# Patient Record
Sex: MALE | Race: WHITE | NOT HISPANIC OR LATINO | URBAN - METROPOLITAN AREA
[De-identification: names, ages, dates, MRNs, and addresses within clinical notes are randomized per-mention and may not be internally consistent; named-entity substitution may affect disease eponyms.]

---

## 2022-10-13 VITALS
DIASTOLIC BLOOD PRESSURE: 73 MMHG | HEART RATE: 78 BPM | RESPIRATION RATE: 12 BRPM | SYSTOLIC BLOOD PRESSURE: 111 MMHG | TEMPERATURE: 97 F | OXYGEN SATURATION: 100 %

## 2022-10-13 LAB
ALBUMIN SERPL ELPH-MCNC: 4 G/DL — SIGNIFICANT CHANGE UP (ref 3.4–5)
ALP SERPL-CCNC: 119 U/L — SIGNIFICANT CHANGE UP (ref 40–120)
ALT FLD-CCNC: 33 U/L — SIGNIFICANT CHANGE UP (ref 12–42)
AMPHET UR-MCNC: NEGATIVE — SIGNIFICANT CHANGE UP
ANION GAP SERPL CALC-SCNC: 17 MMOL/L — HIGH (ref 9–16)
APAP SERPL-MCNC: <2 UG/ML — LOW (ref 10–30)
APPEARANCE UR: CLEAR — SIGNIFICANT CHANGE UP
APTT BLD: 29.8 SEC — SIGNIFICANT CHANGE UP (ref 27.5–35.5)
AST SERPL-CCNC: 36 U/L — SIGNIFICANT CHANGE UP (ref 15–37)
BACTERIA # UR AUTO: PRESENT /HPF
BARBITURATES UR SCN-MCNC: NEGATIVE — SIGNIFICANT CHANGE UP
BASOPHILS # BLD AUTO: 0.05 K/UL — SIGNIFICANT CHANGE UP (ref 0–0.2)
BASOPHILS NFR BLD AUTO: 0.4 % — SIGNIFICANT CHANGE UP (ref 0–2)
BENZODIAZ UR-MCNC: NEGATIVE — SIGNIFICANT CHANGE UP
BILIRUB SERPL-MCNC: 0.5 MG/DL — SIGNIFICANT CHANGE UP (ref 0.2–1.2)
BILIRUB UR-MCNC: NEGATIVE — SIGNIFICANT CHANGE UP
BUN SERPL-MCNC: 30 MG/DL — HIGH (ref 7–23)
CALCIUM SERPL-MCNC: 8.7 MG/DL — SIGNIFICANT CHANGE UP (ref 8.5–10.5)
CHLORIDE SERPL-SCNC: 104 MMOL/L — SIGNIFICANT CHANGE UP (ref 96–108)
CO2 SERPL-SCNC: 18 MMOL/L — LOW (ref 22–31)
COCAINE METAB.OTHER UR-MCNC: NEGATIVE — SIGNIFICANT CHANGE UP
COHGB MFR BLDV: 5.9 % — HIGH
COLOR SPEC: YELLOW — SIGNIFICANT CHANGE UP
CREAT SERPL-MCNC: 1.41 MG/DL — HIGH (ref 0.5–1.3)
DIFF PNL FLD: ABNORMAL
EGFR: 58 ML/MIN/1.73M2 — LOW
EOSINOPHIL # BLD AUTO: 0.48 K/UL — SIGNIFICANT CHANGE UP (ref 0–0.5)
EOSINOPHIL NFR BLD AUTO: 4 % — SIGNIFICANT CHANGE UP (ref 0–6)
EPI CELLS # UR: SIGNIFICANT CHANGE UP /HPF (ref 0–5)
ETHANOL SERPL-MCNC: 159 MG/DL — HIGH
GLUCOSE BLDC GLUCOMTR-MCNC: 150 MG/DL — HIGH (ref 70–99)
GLUCOSE SERPL-MCNC: 197 MG/DL — HIGH (ref 70–99)
GLUCOSE UR QL: NEGATIVE — SIGNIFICANT CHANGE UP
GRAN CASTS # UR COMP ASSIST: ABNORMAL /LPF
HCT VFR BLD CALC: 47.8 % — SIGNIFICANT CHANGE UP (ref 39–50)
HGB BLD-MCNC: 15.7 G/DL — SIGNIFICANT CHANGE UP (ref 13–17)
HYALINE CASTS # UR AUTO: ABNORMAL /LPF (ref 0–2)
IMM GRANULOCYTES NFR BLD AUTO: 1 % — HIGH (ref 0–0.9)
INR BLD: 1.01 — SIGNIFICANT CHANGE UP (ref 0.88–1.16)
KETONES UR-MCNC: NEGATIVE — SIGNIFICANT CHANGE UP
LACTATE SERPL-SCNC: 6.7 MMOL/L — CRITICAL HIGH (ref 0.4–2)
LEUKOCYTE ESTERASE UR-ACNC: ABNORMAL
LYMPHOCYTES # BLD AUTO: 35.8 % — SIGNIFICANT CHANGE UP (ref 13–44)
LYMPHOCYTES # BLD AUTO: 4.34 K/UL — HIGH (ref 1–3.3)
MAGNESIUM SERPL-MCNC: 2.3 MG/DL — SIGNIFICANT CHANGE UP (ref 1.6–2.6)
MCHC RBC-ENTMCNC: 32.5 PG — SIGNIFICANT CHANGE UP (ref 27–34)
MCHC RBC-ENTMCNC: 32.8 GM/DL — SIGNIFICANT CHANGE UP (ref 32–36)
MCV RBC AUTO: 99 FL — SIGNIFICANT CHANGE UP (ref 80–100)
METHADONE UR-MCNC: NEGATIVE — SIGNIFICANT CHANGE UP
METHGB MFR BLDV: 0.7 % — SIGNIFICANT CHANGE UP
MONOCYTES # BLD AUTO: 0.85 K/UL — SIGNIFICANT CHANGE UP (ref 0–0.9)
MONOCYTES NFR BLD AUTO: 7 % — SIGNIFICANT CHANGE UP (ref 2–14)
NEUTROPHILS # BLD AUTO: 6.29 K/UL — SIGNIFICANT CHANGE UP (ref 1.8–7.4)
NEUTROPHILS NFR BLD AUTO: 51.8 % — SIGNIFICANT CHANGE UP (ref 43–77)
NITRITE UR-MCNC: NEGATIVE — SIGNIFICANT CHANGE UP
NRBC # BLD: 0 /100 WBCS — SIGNIFICANT CHANGE UP (ref 0–0)
OPIATES UR-MCNC: NEGATIVE — SIGNIFICANT CHANGE UP
PCO2 BLDV: 46 MMHG — SIGNIFICANT CHANGE UP (ref 42–55)
PCP SPEC-MCNC: SIGNIFICANT CHANGE UP
PCP UR-MCNC: NEGATIVE — SIGNIFICANT CHANGE UP
PH BLDV: 7.15 — LOW (ref 7.32–7.43)
PH UR: 6 — SIGNIFICANT CHANGE UP (ref 5–8)
PLATELET # BLD AUTO: 181 K/UL — SIGNIFICANT CHANGE UP (ref 150–400)
PO2 BLDV: 192 MMHG — HIGH (ref 25–45)
POTASSIUM SERPL-MCNC: 3.4 MMOL/L — LOW (ref 3.5–5.3)
POTASSIUM SERPL-SCNC: 3.4 MMOL/L — LOW (ref 3.5–5.3)
PROT SERPL-MCNC: 7.5 G/DL — SIGNIFICANT CHANGE UP (ref 6.4–8.2)
PROT UR-MCNC: 100 MG/DL
PROTHROM AB SERPL-ACNC: 11.7 SEC — SIGNIFICANT CHANGE UP (ref 10.5–13.4)
RBC # BLD: 4.83 M/UL — SIGNIFICANT CHANGE UP (ref 4.2–5.8)
RBC # FLD: 12.8 % — SIGNIFICANT CHANGE UP (ref 10.3–14.5)
RBC CASTS # UR COMP ASSIST: < 5 /HPF — SIGNIFICANT CHANGE UP
SALICYLATES SERPL-MCNC: 3.9 MG/DL — SIGNIFICANT CHANGE UP (ref 2.8–20)
SAO2 % BLDV: 99.9 % — HIGH (ref 67–88)
SODIUM SERPL-SCNC: 139 MMOL/L — SIGNIFICANT CHANGE UP (ref 132–145)
SP GR SPEC: 1.02 — SIGNIFICANT CHANGE UP (ref 1–1.03)
THC UR QL: POSITIVE
TSH SERPL-MCNC: 16.47 UIU/ML — HIGH (ref 0.36–3.74)
UROBILINOGEN FLD QL: 0.2 E.U./DL — SIGNIFICANT CHANGE UP
WBC # BLD: 12.13 K/UL — HIGH (ref 3.8–10.5)
WBC # FLD AUTO: 12.13 K/UL — HIGH (ref 3.8–10.5)
WBC UR QL: > 10 /HPF

## 2022-10-13 PROCEDURE — 93010 ELECTROCARDIOGRAM REPORT: CPT

## 2022-10-13 PROCEDURE — 70450 CT HEAD/BRAIN W/O DYE: CPT | Mod: 26

## 2022-10-13 PROCEDURE — 72125 CT NECK SPINE W/O DYE: CPT | Mod: 26

## 2022-10-13 PROCEDURE — 71045 X-RAY EXAM CHEST 1 VIEW: CPT | Mod: 26

## 2022-10-13 PROCEDURE — 99291 CRITICAL CARE FIRST HOUR: CPT

## 2022-10-13 RX ORDER — SODIUM CHLORIDE 9 MG/ML
1000 INJECTION INTRAMUSCULAR; INTRAVENOUS; SUBCUTANEOUS ONCE
Refills: 0 | Status: COMPLETED | OUTPATIENT
Start: 2022-10-13 | End: 2022-10-13

## 2022-10-13 RX ORDER — SODIUM CHLORIDE 9 MG/ML
1000 INJECTION INTRAMUSCULAR; INTRAVENOUS; SUBCUTANEOUS
Refills: 0 | Status: DISCONTINUED | OUTPATIENT
Start: 2022-10-13 | End: 2022-10-14

## 2022-10-13 RX ORDER — NALOXONE HYDROCHLORIDE 4 MG/.1ML
2 SPRAY NASAL ONCE
Refills: 0 | Status: COMPLETED | OUTPATIENT
Start: 2022-10-13 | End: 2022-10-13

## 2022-10-13 RX ADMIN — SODIUM CHLORIDE 1000 MILLILITER(S): 9 INJECTION INTRAMUSCULAR; INTRAVENOUS; SUBCUTANEOUS at 23:49

## 2022-10-13 RX ADMIN — NALOXONE HYDROCHLORIDE 2 MILLIGRAM(S): 4 SPRAY NASAL at 22:51

## 2022-10-13 RX ADMIN — SODIUM CHLORIDE 1000 MILLILITER(S): 9 INJECTION INTRAMUSCULAR; INTRAVENOUS; SUBCUTANEOUS at 23:48

## 2022-10-13 RX ADMIN — SODIUM CHLORIDE 1000 MILLILITER(S): 9 INJECTION INTRAMUSCULAR; INTRAVENOUS; SUBCUTANEOUS at 23:40

## 2022-10-13 NOTE — ED ADULT TRIAGE NOTE - CHIEF COMPLAINT QUOTE
[place] - per EMS pt is contracted for work and was found unconscious and blue. given narcan x3 by PD and x2 by EMS. on arrival to ED, oral airway in place and ambu bag for oxygen. unresponsive to stimuli

## 2022-10-13 NOTE — ED ADULT NURSE NOTE - OBJECTIVE STATEMENT
BIBEMS found unresponsive in [place] station. Given narcan 12 MG IN with no response.   Upon arrival to [facility], pt is unresponsive to pain, being manually bagged with OPA in place. Narcan 2 mg given IVP BIBEMS found unresponsive in [facility]. Given narcan 12 MG IN with no response.   Upon arrival to [facility], pt is unresponsive to pain, being manually bagged with OPA in place. Narcan 2 mg given IVP. Pt began breathing on his own, still minimally responsive.  Approx. 10 min later, painful stimuli initiated by MD, and pt responded appropriately and woke up.  Pt altered, and confused. Reoriented to situation.  Taken to CT scan

## 2022-10-14 ENCOUNTER — INPATIENT (INPATIENT)
Facility: HOSPITAL | Age: 57
LOS: 1 days | Discharge: HOME | DRG: 189 | End: 2022-10-16
Attending: INTERNAL MEDICINE | Admitting: INTERNAL MEDICINE
Payer: COMMERCIAL

## 2022-10-14 DIAGNOSIS — R79.89 OTHER SPECIFIED ABNORMAL FINDINGS OF BLOOD CHEMISTRY: ICD-10-CM

## 2022-10-14 DIAGNOSIS — Z29.9 ENCOUNTER FOR PROPHYLACTIC MEASURES, UNSPECIFIED: ICD-10-CM

## 2022-10-14 DIAGNOSIS — F10.929 ALCOHOL USE, UNSPECIFIED WITH INTOXICATION, UNSPECIFIED: ICD-10-CM

## 2022-10-14 DIAGNOSIS — R41.82 ALTERED MENTAL STATUS, UNSPECIFIED: ICD-10-CM

## 2022-10-14 DIAGNOSIS — J96.02 ACUTE RESPIRATORY FAILURE WITH HYPERCAPNIA: ICD-10-CM

## 2022-10-14 DIAGNOSIS — I10 ESSENTIAL (PRIMARY) HYPERTENSION: ICD-10-CM

## 2022-10-14 DIAGNOSIS — R77.8 OTHER SPECIFIED ABNORMALITIES OF PLASMA PROTEINS: ICD-10-CM

## 2022-10-14 DIAGNOSIS — R56.9 UNSPECIFIED CONVULSIONS: ICD-10-CM

## 2022-10-14 LAB
ALBUMIN SERPL ELPH-MCNC: 4.5 G/DL — SIGNIFICANT CHANGE UP (ref 3.3–5)
ALBUMIN SERPL ELPH-MCNC: 4.9 G/DL — SIGNIFICANT CHANGE UP (ref 3.3–5)
ALP SERPL-CCNC: 117 U/L — SIGNIFICANT CHANGE UP (ref 40–120)
ALP SERPL-CCNC: 122 U/L — HIGH (ref 40–120)
ALT FLD-CCNC: 24 U/L — SIGNIFICANT CHANGE UP (ref 10–45)
ALT FLD-CCNC: 26 U/L — SIGNIFICANT CHANGE UP (ref 10–45)
ANION GAP SERPL CALC-SCNC: 10 MMOL/L — SIGNIFICANT CHANGE UP (ref 5–17)
ANION GAP SERPL CALC-SCNC: 11 MMOL/L — SIGNIFICANT CHANGE UP (ref 5–17)
APTT BLD: 30.1 SEC — SIGNIFICANT CHANGE UP (ref 27.5–35.5)
AST SERPL-CCNC: 28 U/L — SIGNIFICANT CHANGE UP (ref 10–40)
AST SERPL-CCNC: 32 U/L — SIGNIFICANT CHANGE UP (ref 10–40)
BASE EXCESS BLDA CALC-SCNC: -4.8 MMOL/L — LOW (ref -2–3)
BASOPHILS # BLD AUTO: 0.03 K/UL — SIGNIFICANT CHANGE UP (ref 0–0.2)
BASOPHILS NFR BLD AUTO: 0.2 % — SIGNIFICANT CHANGE UP (ref 0–2)
BILIRUB SERPL-MCNC: 0.3 MG/DL — SIGNIFICANT CHANGE UP (ref 0.2–1.2)
BILIRUB SERPL-MCNC: 0.6 MG/DL — SIGNIFICANT CHANGE UP (ref 0.2–1.2)
BLD GP AB SCN SERPL QL: NEGATIVE — SIGNIFICANT CHANGE UP
BUN SERPL-MCNC: 26 MG/DL — HIGH (ref 7–23)
BUN SERPL-MCNC: 30 MG/DL — HIGH (ref 7–23)
CALCIUM SERPL-MCNC: 8.6 MG/DL — SIGNIFICANT CHANGE UP (ref 8.4–10.5)
CALCIUM SERPL-MCNC: 9.1 MG/DL — SIGNIFICANT CHANGE UP (ref 8.4–10.5)
CHLORIDE SERPL-SCNC: 107 MMOL/L — SIGNIFICANT CHANGE UP (ref 96–108)
CHLORIDE SERPL-SCNC: 109 MMOL/L — HIGH (ref 96–108)
CK MB CFR SERPL CALC: 4.9 NG/ML — SIGNIFICANT CHANGE UP (ref 0–6.7)
CK MB CFR SERPL CALC: 7 NG/ML — HIGH (ref 0–6.7)
CK SERPL-CCNC: 132 U/L — SIGNIFICANT CHANGE UP (ref 30–200)
CK SERPL-CCNC: 162 U/L — SIGNIFICANT CHANGE UP (ref 30–200)
CO2 BLDA-SCNC: 26 MMOL/L — HIGH (ref 19–24)
CO2 SERPL-SCNC: 23 MMOL/L — SIGNIFICANT CHANGE UP (ref 22–31)
CO2 SERPL-SCNC: 26 MMOL/L — SIGNIFICANT CHANGE UP (ref 22–31)
CREAT SERPL-MCNC: 0.96 MG/DL — SIGNIFICANT CHANGE UP (ref 0.5–1.3)
CREAT SERPL-MCNC: 1.2 MG/DL — SIGNIFICANT CHANGE UP (ref 0.5–1.3)
EGFR: 70 ML/MIN/1.73M2 — SIGNIFICANT CHANGE UP
EGFR: 92 ML/MIN/1.73M2 — SIGNIFICANT CHANGE UP
EOSINOPHIL # BLD AUTO: 0 K/UL — SIGNIFICANT CHANGE UP (ref 0–0.5)
EOSINOPHIL NFR BLD AUTO: 0 % — SIGNIFICANT CHANGE UP (ref 0–6)
GAS PNL BLDA: SIGNIFICANT CHANGE UP
GAS PNL BLDA: SIGNIFICANT CHANGE UP
GLUCOSE BLDC GLUCOMTR-MCNC: 103 MG/DL — HIGH (ref 70–99)
GLUCOSE BLDC GLUCOMTR-MCNC: 115 MG/DL — HIGH (ref 70–99)
GLUCOSE BLDC GLUCOMTR-MCNC: 262 MG/DL — HIGH (ref 70–99)
GLUCOSE SERPL-MCNC: 119 MG/DL — HIGH (ref 70–99)
GLUCOSE SERPL-MCNC: 131 MG/DL — HIGH (ref 70–99)
HCO3 BLDA-SCNC: 24 MMOL/L — SIGNIFICANT CHANGE UP (ref 21–28)
HCT VFR BLD CALC: 46.7 % — SIGNIFICANT CHANGE UP (ref 39–50)
HGB BLD-MCNC: 15.4 G/DL — SIGNIFICANT CHANGE UP (ref 13–17)
HOROWITZ INDEX BLDA+IHG-RTO: 40 — SIGNIFICANT CHANGE UP
IMM GRANULOCYTES NFR BLD AUTO: 0.5 % — SIGNIFICANT CHANGE UP (ref 0–0.9)
INR BLD: 0.98 — SIGNIFICANT CHANGE UP (ref 0.88–1.16)
LACTATE SERPL-SCNC: 0.9 MMOL/L — SIGNIFICANT CHANGE UP (ref 0.5–2)
LACTATE SERPL-SCNC: 3.3 MMOL/L — HIGH (ref 0.4–2)
LYMPHOCYTES # BLD AUTO: 0.75 K/UL — LOW (ref 1–3.3)
LYMPHOCYTES # BLD AUTO: 6 % — LOW (ref 13–44)
MAGNESIUM SERPL-MCNC: 1.9 MG/DL — SIGNIFICANT CHANGE UP (ref 1.6–2.6)
MCHC RBC-ENTMCNC: 32.4 PG — SIGNIFICANT CHANGE UP (ref 27–34)
MCHC RBC-ENTMCNC: 33 GM/DL — SIGNIFICANT CHANGE UP (ref 32–36)
MCV RBC AUTO: 98.1 FL — SIGNIFICANT CHANGE UP (ref 80–100)
MONOCYTES # BLD AUTO: 0.81 K/UL — SIGNIFICANT CHANGE UP (ref 0–0.9)
MONOCYTES NFR BLD AUTO: 6.4 % — SIGNIFICANT CHANGE UP (ref 2–14)
NEUTROPHILS # BLD AUTO: 10.93 K/UL — HIGH (ref 1.8–7.4)
NEUTROPHILS NFR BLD AUTO: 86.9 % — HIGH (ref 43–77)
NRBC # BLD: 0 /100 WBCS — SIGNIFICANT CHANGE UP (ref 0–0)
PCO2 BLDA: 58 MMHG — HIGH (ref 35–48)
PCO2 BLDV: 59 MMHG — HIGH (ref 42–55)
PCO2 BLDV: 61 MMHG — HIGH (ref 42–55)
PH BLDA: 7.22 — LOW (ref 7.35–7.45)
PH BLDV: 7.16 — LOW (ref 7.32–7.43)
PH BLDV: 7.16 — LOW (ref 7.32–7.43)
PHOSPHATE SERPL-MCNC: 4.8 MG/DL — HIGH (ref 2.5–4.5)
PLATELET # BLD AUTO: 180 K/UL — SIGNIFICANT CHANGE UP (ref 150–400)
PO2 BLDA: 71 MMHG — LOW (ref 83–108)
PO2 BLDV: 140 MMHG — HIGH (ref 25–45)
PO2 BLDV: 96 MMHG — HIGH (ref 25–45)
POTASSIUM SERPL-MCNC: 4.7 MMOL/L — SIGNIFICANT CHANGE UP (ref 3.5–5.3)
POTASSIUM SERPL-MCNC: 5.4 MMOL/L — HIGH (ref 3.5–5.3)
POTASSIUM SERPL-SCNC: 4.7 MMOL/L — SIGNIFICANT CHANGE UP (ref 3.5–5.3)
POTASSIUM SERPL-SCNC: 5.4 MMOL/L — HIGH (ref 3.5–5.3)
PROT SERPL-MCNC: 7.3 G/DL — SIGNIFICANT CHANGE UP (ref 6–8.3)
PROT SERPL-MCNC: 7.4 G/DL — SIGNIFICANT CHANGE UP (ref 6–8.3)
PROTHROM AB SERPL-ACNC: 11.7 SEC — SIGNIFICANT CHANGE UP (ref 10.5–13.4)
RBC # BLD: 4.76 M/UL — SIGNIFICANT CHANGE UP (ref 4.2–5.8)
RBC # FLD: 12.7 % — SIGNIFICANT CHANGE UP (ref 10.3–14.5)
RH IG SCN BLD-IMP: POSITIVE — SIGNIFICANT CHANGE UP
SAO2 % BLDA: 94.7 % — SIGNIFICANT CHANGE UP (ref 94–98)
SAO2 % BLDV: 97.9 % — HIGH (ref 67–88)
SAO2 % BLDV: 99.5 % — HIGH (ref 67–88)
SARS-COV-2 RNA SPEC QL NAA+PROBE: SIGNIFICANT CHANGE UP
SODIUM SERPL-SCNC: 143 MMOL/L — SIGNIFICANT CHANGE UP (ref 135–145)
SODIUM SERPL-SCNC: 143 MMOL/L — SIGNIFICANT CHANGE UP (ref 135–145)
T4 AB SER-ACNC: 7.38 UG/DL — SIGNIFICANT CHANGE UP (ref 4.5–11.7)
TROPONIN T SERPL-MCNC: 0.05 NG/ML — CRITICAL HIGH (ref 0–0.01)
TROPONIN T SERPL-MCNC: 0.06 NG/ML — CRITICAL HIGH (ref 0–0.01)
TROPONIN T SERPL-MCNC: 0.06 NG/ML — CRITICAL HIGH (ref 0–0.01)
WBC # BLD: 12.58 K/UL — HIGH (ref 3.8–10.5)
WBC # FLD AUTO: 12.58 K/UL — HIGH (ref 3.8–10.5)

## 2022-10-14 PROCEDURE — 99291 CRITICAL CARE FIRST HOUR: CPT | Mod: GC

## 2022-10-14 PROCEDURE — 74018 RADEX ABDOMEN 1 VIEW: CPT | Mod: 26

## 2022-10-14 PROCEDURE — 71275 CT ANGIOGRAPHY CHEST: CPT | Mod: 26

## 2022-10-14 PROCEDURE — 93306 TTE W/DOPPLER COMPLETE: CPT | Mod: 26

## 2022-10-14 RX ORDER — NALOXONE HYDROCHLORIDE 4 MG/.1ML
0.8 SPRAY NASAL ONCE
Refills: 0 | Status: COMPLETED | OUTPATIENT
Start: 2022-10-14 | End: 2022-10-14

## 2022-10-14 RX ORDER — HEPARIN SODIUM 5000 [USP'U]/ML
5000 INJECTION INTRAVENOUS; SUBCUTANEOUS EVERY 8 HOURS
Refills: 0 | Status: DISCONTINUED | OUTPATIENT
Start: 2022-10-14 | End: 2022-10-16

## 2022-10-14 RX ORDER — CHLORHEXIDINE GLUCONATE 213 G/1000ML
1 SOLUTION TOPICAL
Refills: 0 | Status: DISCONTINUED | OUTPATIENT
Start: 2022-10-14 | End: 2022-10-16

## 2022-10-14 RX ORDER — LABETALOL HCL 100 MG
10 TABLET ORAL ONCE
Refills: 0 | Status: COMPLETED | OUTPATIENT
Start: 2022-10-14 | End: 2022-10-14

## 2022-10-14 RX ORDER — INSULIN LISPRO 100/ML
VIAL (ML) SUBCUTANEOUS
Refills: 0 | Status: DISCONTINUED | OUTPATIENT
Start: 2022-10-14 | End: 2022-10-16

## 2022-10-14 RX ORDER — AMLODIPINE BESYLATE 2.5 MG/1
5 TABLET ORAL DAILY
Refills: 0 | Status: DISCONTINUED | OUTPATIENT
Start: 2022-10-14 | End: 2022-10-14

## 2022-10-14 RX ORDER — NICOTINE POLACRILEX 2 MG
1 GUM BUCCAL DAILY
Refills: 0 | Status: DISCONTINUED | OUTPATIENT
Start: 2022-10-14 | End: 2022-10-16

## 2022-10-14 RX ORDER — NALOXONE HYDROCHLORIDE 4 MG/.1ML
0.02 SPRAY NASAL
Qty: 2 | Refills: 0 | Status: DISCONTINUED | OUTPATIENT
Start: 2022-10-14 | End: 2022-10-14

## 2022-10-14 RX ORDER — SODIUM ZIRCONIUM CYCLOSILICATE 10 G/10G
10 POWDER, FOR SUSPENSION ORAL ONCE
Refills: 0 | Status: COMPLETED | OUTPATIENT
Start: 2022-10-14 | End: 2022-10-14

## 2022-10-14 RX ORDER — NALOXONE HYDROCHLORIDE 4 MG/.1ML
0.4 SPRAY NASAL ONCE
Refills: 0 | Status: COMPLETED | OUTPATIENT
Start: 2022-10-14 | End: 2022-10-14

## 2022-10-14 RX ORDER — GLUCAGON INJECTION, SOLUTION 0.5 MG/.1ML
1 INJECTION, SOLUTION SUBCUTANEOUS ONCE
Refills: 0 | Status: DISCONTINUED | OUTPATIENT
Start: 2022-10-14 | End: 2022-10-16

## 2022-10-14 RX ORDER — DEXTROSE 50 % IN WATER 50 %
15 SYRINGE (ML) INTRAVENOUS ONCE
Refills: 0 | Status: DISCONTINUED | OUTPATIENT
Start: 2022-10-14 | End: 2022-10-16

## 2022-10-14 RX ORDER — DEXTROSE 50 % IN WATER 50 %
25 SYRINGE (ML) INTRAVENOUS ONCE
Refills: 0 | Status: DISCONTINUED | OUTPATIENT
Start: 2022-10-14 | End: 2022-10-16

## 2022-10-14 RX ORDER — LABETALOL HCL 100 MG
5 TABLET ORAL ONCE
Refills: 0 | Status: COMPLETED | OUTPATIENT
Start: 2022-10-14 | End: 2022-10-14

## 2022-10-14 RX ORDER — SODIUM CHLORIDE 9 MG/ML
1000 INJECTION, SOLUTION INTRAVENOUS
Refills: 0 | Status: DISCONTINUED | OUTPATIENT
Start: 2022-10-14 | End: 2022-10-16

## 2022-10-14 RX ORDER — IPRATROPIUM/ALBUTEROL SULFATE 18-103MCG
3 AEROSOL WITH ADAPTER (GRAM) INHALATION EVERY 4 HOURS
Refills: 0 | Status: DISCONTINUED | OUTPATIENT
Start: 2022-10-14 | End: 2022-10-16

## 2022-10-14 RX ORDER — DEXTROSE 50 % IN WATER 50 %
12.5 SYRINGE (ML) INTRAVENOUS ONCE
Refills: 0 | Status: DISCONTINUED | OUTPATIENT
Start: 2022-10-14 | End: 2022-10-16

## 2022-10-14 RX ORDER — LANOLIN ALCOHOL/MO/W.PET/CERES
5 CREAM (GRAM) TOPICAL AT BEDTIME
Refills: 0 | Status: DISCONTINUED | OUTPATIENT
Start: 2022-10-14 | End: 2022-10-16

## 2022-10-14 RX ORDER — AMLODIPINE BESYLATE 2.5 MG/1
10 TABLET ORAL EVERY 24 HOURS
Refills: 0 | Status: DISCONTINUED | OUTPATIENT
Start: 2022-10-14 | End: 2022-10-16

## 2022-10-14 RX ORDER — INFLUENZA VIRUS VACCINE 15; 15; 15; 15 UG/.5ML; UG/.5ML; UG/.5ML; UG/.5ML
0.5 SUSPENSION INTRAMUSCULAR ONCE
Refills: 0 | Status: DISCONTINUED | OUTPATIENT
Start: 2022-10-14 | End: 2022-10-16

## 2022-10-14 RX ORDER — NALOXONE HYDROCHLORIDE 4 MG/.1ML
0.4 SPRAY NASAL ONCE
Refills: 0 | Status: DISCONTINUED | OUTPATIENT
Start: 2022-10-14 | End: 2022-10-14

## 2022-10-14 RX ORDER — THIAMINE MONONITRATE (VIT B1) 100 MG
500 TABLET ORAL EVERY 8 HOURS
Refills: 0 | Status: DISCONTINUED | OUTPATIENT
Start: 2022-10-14 | End: 2022-10-16

## 2022-10-14 RX ORDER — HYDRALAZINE HCL 50 MG
5 TABLET ORAL ONCE
Refills: 0 | Status: COMPLETED | OUTPATIENT
Start: 2022-10-14 | End: 2022-10-14

## 2022-10-14 RX ADMIN — Medication 1 PATCH: at 22:20

## 2022-10-14 RX ADMIN — Medication 5 MILLIGRAM(S): at 21:38

## 2022-10-14 RX ADMIN — Medication 5 MILLIGRAM(S): at 11:22

## 2022-10-14 RX ADMIN — SODIUM CHLORIDE 1000 MILLILITER(S): 9 INJECTION INTRAMUSCULAR; INTRAVENOUS; SUBCUTANEOUS at 01:36

## 2022-10-14 RX ADMIN — Medication 105 MILLIGRAM(S): at 11:00

## 2022-10-14 RX ADMIN — Medication 10 MILLIGRAM(S): at 14:08

## 2022-10-14 RX ADMIN — SODIUM ZIRCONIUM CYCLOSILICATE 10 GRAM(S): 10 POWDER, FOR SUSPENSION ORAL at 09:31

## 2022-10-14 RX ADMIN — AMLODIPINE BESYLATE 5 MILLIGRAM(S): 2.5 TABLET ORAL at 11:23

## 2022-10-14 RX ADMIN — Medication 10 MILLIGRAM(S): at 13:44

## 2022-10-14 RX ADMIN — Medication 10 MILLIGRAM(S): at 08:16

## 2022-10-14 RX ADMIN — Medication 3 MILLILITER(S): at 13:09

## 2022-10-14 RX ADMIN — Medication 6: at 21:56

## 2022-10-14 RX ADMIN — NALOXONE HYDROCHLORIDE 0.4 MILLIGRAM(S): 4 SPRAY NASAL at 12:20

## 2022-10-14 RX ADMIN — Medication 3 MILLILITER(S): at 08:17

## 2022-10-14 RX ADMIN — HEPARIN SODIUM 5000 UNIT(S): 5000 INJECTION INTRAVENOUS; SUBCUTANEOUS at 21:41

## 2022-10-14 RX ADMIN — Medication 105 MILLIGRAM(S): at 21:41

## 2022-10-14 RX ADMIN — NALOXONE HYDROCHLORIDE 0.8 MILLIGRAM(S): 4 SPRAY NASAL at 12:51

## 2022-10-14 RX ADMIN — Medication 3 MILLILITER(S): at 17:19

## 2022-10-14 RX ADMIN — Medication 40 MILLIGRAM(S): at 10:34

## 2022-10-14 RX ADMIN — Medication 1 TABLET(S): at 10:34

## 2022-10-14 RX ADMIN — AMLODIPINE BESYLATE 10 MILLIGRAM(S): 2.5 TABLET ORAL at 21:38

## 2022-10-14 RX ADMIN — Medication 3 MILLILITER(S): at 21:41

## 2022-10-14 NOTE — H&P ADULT - HISTORY OF PRESENT ILLNESS
53 yo M with PMH significant for 40+ pack year smoking history, HTN (untreated), SAVANNAH (no longer on CPAP after significant 100+ lb intentional wt loss), alcohol abuse (5+ mixed drinks daily) presenting from [facility] after pt was found to be unresponsive in [place] station after a night of drinking during a company party. Unclear of amount of alcohol intake, however pt was BIBEMS after called by friends after pt passing out and unable to be awoken. BLS crew gave multiple doses Narcan with no response. On ED arrival, given additional 2 mg IV Narcan with some response as pt was able to be aroused by sternal rub and did not require intubation which was in process of being set up due to altered mental status. Placed on Bipap for respiratory support.     Pt was arousable on exam however remains intermittently lethargic and confused with no recollection of the events overnight. AOx1-2 and repeatedly turned to wife for medical and interval history. Remains with pinpoint pupils, denies any substance use besides marijuana and alcohol. Wife at bedside denies history of seizures, previous withdrawal or hospitalizations.     ED Course:   Vitals: T: 97.4, HR: 78, BP: 111/73, SpO2: 100% on ambu bag/assisted ventilation -> 96% on 4L NC. Desats 90-91% RA  Labs: VBG pH: 7.15, pco2 46, po2 192, carboxyhb.9, K+: 3.4, BUN/Cr: 30/1.41; lactate: 6.7; TSH: 16, WBC: 12, Hbg: 15.7  UTox: Alcohol+ (159), THC+  Imaging: CTH negative, EKG NSR, CT PE negative for PE/consolidations  Interventions: Narcan 2 mg IV

## 2022-10-14 NOTE — PROGRESS NOTE ADULT - PROBLEM SELECTOR PLAN 2
Likely 2/2 alcohol intoxication with component of chronic retention from lung disease iso extensive smoking history. Almost intubated in ED however had response to Narcan in ED and is tolerating bipap at this time although intermittently lethargic   -C/w Bipap   -Serial ABGs to monitor for improvement of acidosis/hypercarbia   -Management of AMS as above     #r/o COPD   Pt likely with undiagnosed COPD with 40+ pack year history. CT 10/14: Mild paraseptal emphysema. Mosaic attenuation. Mild bibasilar subpleural reticulation. No   consolidation. Hypercarbic on ABG likely d/t being chronic retainer with superimposed acidemia and hypercarbia d/t alcohol intoxication. Improving on duonebs and Bipap  -C/w Bipap with de-escalation to NC as tolerated when mental status improves   -Prednisone 40 mg for 5 days (10/14-10/18)   -Duonebs q4h Likely 2/2 alcohol intoxication with component of chronic retention from lung disease iso extensive smoking history. Almost intubated in ED however had response to Narcan in ED and is tolerating bipap at this time although intermittently lethargic   -C/w Bipap overnight  -Serial ABGs to monitor for improvement of acidosis/hypercarbia   -Management of AMS as above   -pH 7.22->7.34, CO2 58 -> 47 while on BiPAP    #r/o COPD   Pt likely with undiagnosed COPD with 40+ pack year history. CT 10/14: Mild paraseptal emphysema. Mosaic attenuation. Mild bibasilar subpleural reticulation. No consolidation. Hypercarbic on ABG likely d/t being chronic retainer with superimposed acidemia and hypercarbia d/t alcohol intoxication. Improving on duonebs and Bipap  -C/w Bipap with de-escalation to NC as tolerated    -Prednisone 40 mg for 5 days (10/14-10/18)   -Duonebs q4h

## 2022-10-14 NOTE — ED PROVIDER NOTE - OBJECTIVE STATEMENT
Pt is a 55yo M with unknown PMH/PSH.  Pt is BIB EMS after being found down in the service tunnels at [facility].  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  Found unresponsive and "blue."  BLS crew placed an oral airway and started bagging patient.  Never lost pulse.  Color came back with BVM.  Pt noted to have pinpoint pupils b/l and was given multiple doses of narcan IN en route, up to 12mg.  No response.      On arrival here, pt noted to have pinpoint pupils, strong pulses throughout, and breathing on his own.  Initially pt unresponsive with GCS of 8.  Pt given 2mg of Narcan IV.  Initially no response but while setting up to intubate, we sternally rubbed patient and he opened eyes and came two.  He is very confused and has no memory of night.  Pt noted to be perseverating and keeps asking repetatively what happened to him. Pt is a 55yo M with unknown PMH/PSH.  Pt is BIB EMS after being found down in the service tunnels at [facility] [employer] [facility].  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  Found unresponsive and "blue."  BLS crew placed an oral airway and started bagging patient.  Never lost pulse.  Color came back with BVM.  Pt noted to have pinpoint pupils b/l and was given multiple doses of narcan IN en route, up to 12mg.  No response.      On arrival here, pt noted to have pinpoint pupils, strong pulses throughout, and breathing on his own.  Initially pt unresponsive with GCS of 8.  Pt given 2mg of Narcan IV.  Initially no response but while setting up to intubate, we sternally rubbed patient and he opened eyes and came two.  He is very confused and has no memory of night.  Pt noted to be perseverating and keeps asking repetitively what happened to him.    Additional history obtained from  and pt's wife Jaleesa, who met him here in the ED.  They were interviewed separately but gave same history.  They reports h/o HTN but not currently on medication.  Also daily smoker for 40+ years.  Remote h/o asthma.  Denies any h/o seizures.  He is a daily drinker.  Wife reports drinks heavily but no h/o etoh w/d.  Pt has no recollection of what happened today and his last memory is going to work.  he works as a  at [employer].   Co-worker also meets patient here in ED but he did not see patient for latter part of day and does not know what happened to pt.

## 2022-10-14 NOTE — PROGRESS NOTE ADULT - PROBLEM SELECTOR PLAN 1
2/2 hypercarbic respiratory failure iso alcohol intoxication vs possible superimposed opioid use. Found unresponsive in [place] station after admitted extensive alcohol use overnight (cannot quantify amount). Hx of 5-6 mixed drinks daily per wife at bedside, Utox+ alcohol (@ 159 mg/dl). Negative opioid panel, however pin point pupils on exam with some response to Narcan 2 mg IV in ED. Currently remains intermittently lethargic. ABG acidotic @ 7.26 with hypercarbia (CO2: 57); Likely CO2 retainer with undiagnosed COPD iso smoking history and rhonchi on lung exam   -Management for etoh intoxication as below   -Giving additional narcan 0.4 iso persisting hypercarbia, pinpoint pupils iso AMS   -Serial ABGs to monitor hypercarbia 2/2 hypercarbic respiratory failure iso alcohol intoxication vs possible superimposed opioid use. Found unresponsive in [place] station after admitted extensive alcohol use overnight (cannot quantify amount). Hx of 5-6 mixed drinks daily per wife at bedside, Utox+ alcohol (@ 159 mg/dl). Negative opioid panel, however pin point pupils on exam with some response to Narcan 2 mg IV in ED. Currently remains intermittently lethargic. ABG acidotic @ 7.26 with hypercarbia (CO2: 57); Likely CO2 retainer with undiagnosed COPD iso smoking history and rhonchi on lung exam   -Management for etoh intoxication as below   -Given additional narcan 0.4 and 0.8 during day (10/14) iso persisting hypercarbia, pinpoint pupils iso AMS   -Serial ABGs to monitor hypercarbia  -pH 7.22->7.34, CO2 58 -> 47 while on BiPAP 2/2 hypercarbic respiratory failure iso alcohol intoxication vs possible superimposed opioid use. Found unresponsive in [place] station after admitted extensive alcohol use overnight (cannot quantify amount). Hx of 5-6 mixed drinks daily per wife at bedside, Utox+ alcohol (@ 159 mg/dl). Negative opioid panel, however pin point pupils on exam with some response to Narcan 2 mg IV in ED. Currently remains intermittently lethargic. ABG acidotic @ 7.26 with hypercarbia (CO2: 57); Likely CO2 retainer with undiagnosed COPD iso smoking history and rhonchi on lung exam   -Management for etoh intoxication as below   -Given additional narcan 0.4 and 0.8 during day (10/14) iso persisting hypercarbia, pinpoint pupils iso AMS   -Serial ABGs to monitor hypercarbia  -pH 7.22->7.34, CO2 58 -> 47 while on BiPAP; remained on BiPAP overnight

## 2022-10-14 NOTE — PATIENT PROFILE ADULT - FALL HARM RISK - RISK INTERVENTIONS

## 2022-10-14 NOTE — PROGRESS NOTE ADULT - PROBLEM SELECTOR PLAN 5
EKG NSR. Tropes elevated to 0.05 on admission, likely demand ischemia. No hx per pt or wife at bedside of CAD or cardiac pathology. Denies having previous ECHOs or stress tests. Denies chest pain, palpitations   -Trend troponins to peak  -Obtain baseline ECHO EKG NSR. Trops elevated to 0.05 on admission, likely demand ischemia. No hx per pt or wife at bedside of CAD or cardiac pathology. Denies having previous ECHOs or stress tests. Denies chest pain, palpitations.  -Trend troponins to peak  -Obtain baseline ECHO

## 2022-10-14 NOTE — H&P ADULT - ASSESSMENT
55 yo M with PMH significant for 40+ pack year smoking history, HTN (untreated), SAVANNAH (no longer on CPAP after significant 100+ lb intentional wt loss), alcohol abuse (5+ mixed drinks daily) presenting from [facility] with altered mental status iso alcohol intoxication, admitted for hypercarbic respiratory failure likely 2/2 alcohol intoxication vs possible superimposed opioid overdose     NEURO     #AMS   2/2 hypercarbic respiratory failure iso alcohol intoxication vs possible superimposed opioid use. Found unresponsive in [place] station after admitted extensive alcohol use overnight (cannot quantify amount). Hx of 5-6 mixed drinks daily per wife at bedside, Utox+ alcohol (@ 159 mg/dl). Negative opioid panel, however pin point pupils on exam with some response to Narcan 2 mg IV in ED. Currently remains intermittently lethargic. ABG acidotic @ 7.26 with hypercarbia (CO2: 57); Likely CO2 retainer with undiagnosed COPD iso smoking history and rhonchi on lung exam   -Management for etoh intoxication as below   -Giving additional narcan 0.4 iso persisting hypercarbia, pinpoint pupils iso AMS   -Serial ABGs to monitor hypercarbia     #Etoh intoxication   Assessment as above  -CIWA protocol   -Avoiding standing benzos iso AMS  -C/w high dose thiamine for 3 days (10/14-10/16)  -C/w MV     PULM    #Hypercarbic respiratory failure   Likely 2/2 alcohol intoxication with component of chronic retention from lung disease iso extensive smoking history. Almost intubated in ED however had response to Narcan in ED and is tolerating bipap at this time although intermittently lethargic   -C/w Bipap   -Serial ABGs to monitor for improvement of acidosis/hypercarbia   -Management of AMS as above     #r/o COPD   Pt likely with undiagnosed COPD with 40+ pack year history. CT 10/14: Mild paraseptal emphysema. Mosaic attenuation. Mild bibasilar subpleural reticulation. No   consolidation. Hypercarbic on ABG likely d/t being chronic retainer with superimposed acidemia and hypercarbia d/t alcohol intoxication. Improving on duonebs and Bipap  -C/w Bipap with de-escalation to NC as tolerated when mental status improves   -Prednisone 40 mg for 5 days (10/14-10/18)   -Duonebs q4h      CVS  #Elevated troponins   EKG NSR. Tropes elevated to 0.05 on admission, likely demand ischemia. No hx per pt or wife at bedside of CAD or cardiac pathology. Denies having previous ECHOs or stress tests. Denies chest pain, palpitations   -Trend troponins to peak  -Obtain baseline ECHO    #HTN   History of untreated HTN with hypertensive urgency with systolic 180-200s. S/p labetalol 10 mg x1, 5 mg x1. Asymptomatic  -Started amlodipine 5 mg qd     GI  NPO while Bipap     ENDO  -on mISS    #Elevated TSH  TSH 16 on admission. Admits to family hx of thyroid disorder however denies personal hx. Asymptomatic at this time   -F/u T3, T4     RENAL  DEUCE    ID  DEUCE    HEME  DEUCE    F: None  E: Replete PRN  GI ppx: None   DVT ppx: Heparin sub q   Lines: peripheral  Dispo: MICU

## 2022-10-14 NOTE — PROGRESS NOTE ADULT - SUBJECTIVE AND OBJECTIVE BOX
***ACCEPTANCE FROM MICU TO TELEMETRY***    Hospital course:      OVERNIGHT EVENTS:    SUBJECTIVE / INTERVAL HPI: Patient seen and examined at bedside.     VITAL SIGNS:  Vital Signs Last 24 Hrs  T(C): 36.6 (14 Oct 2022 17:25), Max: 37.1 (14 Oct 2022 06:00)  T(F): 97.9 (14 Oct 2022 17:25), Max: 98.8 (14 Oct 2022 06:00)  HR: 68 (14 Oct 2022 20:15) (68 - 112)  BP: 184/80 (14 Oct 2022 20:15) (111/65 - 219/94)  BP(mean): 115 (14 Oct 2022 20:15) (96 - 135)  RR: 16 (14 Oct 2022 20:15) (5 - 25)  SpO2: 95% (14 Oct 2022 20:15) (86% - 100%)    Parameters below as of 14 Oct 2022 20:15  Patient On (Oxygen Delivery Method): nasal cannula  O2 Flow (L/min): 2      PHYSICAL EXAM:    General: alert, in no acute distress  HEENT: NC/AT; PERRL, anicteric sclera; MMM  Neck: supple  Cardiovascular: +S1/S2, RRR  Respiratory: CTA B/L; no W/R/R  Gastrointestinal: soft, NT/ND; +BSx4  Extremities: WWP; no edema, clubbing or cyanosis  Vascular: 2+ radial, DP/PT pulses B/L  Neurological: AAOx3; no focal deficits    MEDICATIONS:  MEDICATIONS  (STANDING):  albuterol/ipratropium for Nebulization 3 milliLiter(s) Nebulizer every 4 hours  amLODIPine   Tablet 10 milliGRAM(s) Oral every 24 hours  chlorhexidine 2% Cloths 1 Application(s) Topical <User Schedule>  dextrose 5%. 1000 milliLiter(s) (100 mL/Hr) IV Continuous <Continuous>  dextrose 5%. 1000 milliLiter(s) (50 mL/Hr) IV Continuous <Continuous>  dextrose 50% Injectable 25 Gram(s) IV Push once  dextrose 50% Injectable 12.5 Gram(s) IV Push once  dextrose 50% Injectable 25 Gram(s) IV Push once  glucagon  Injectable 1 milliGRAM(s) IntraMuscular once  heparin   Injectable 5000 Unit(s) SubCutaneous every 8 hours  influenza   Vaccine 0.5 milliLiter(s) IntraMuscular once  insulin lispro (ADMELOG) corrective regimen sliding scale   SubCutaneous Before meals and at bedtime  multivitamin 1 Tablet(s) Oral daily  predniSONE   Tablet 40 milliGRAM(s) Oral daily  thiamine IVPB 500 milliGRAM(s) IV Intermittent every 8 hours    MEDICATIONS  (PRN):  dextrose Oral Gel 15 Gram(s) Oral once PRN Blood Glucose LESS THAN 70 milliGRAM(s)/deciliter      ALLERGIES:  Allergies    No Known Allergies    Intolerances        LABS:                        15.4   12.58 )-----------( 180      ( 14 Oct 2022 07:38 )             46.7     10-    143  |  107  |  26<H>  ----------------------------<  119<H>  4.7   |  26  |  0.96    Ca    9.1      14 Oct 2022 14:59  Phos  4.8     10-14  Mg     1.9     10-    TPro  7.3  /  Alb  4.5  /  TBili  0.6  /  DBili  x   /  AST  28  /  ALT  24  /  AlkPhos  122<H>  10-14    PT/INR - ( 14 Oct 2022 07:38 )   PT: 11.7 sec;   INR: 0.98          PTT - ( 14 Oct 2022 07:38 )  PTT:30.1 sec  Urinalysis Basic - ( 13 Oct 2022 22:56 )    Color: Yellow / Appearance: Clear / S.025 / pH: x  Gluc: x / Ketone: NEGATIVE  / Bili: NEGATIVE / Urobili: 0.2 E.U./dL   Blood: x / Protein: 100 mg/dL / Nitrite: NEGATIVE   Leuk Esterase: Small / RBC: < 5 /HPF / WBC > 10 /HPF   Sq Epi: x / Non Sq Epi: 0-5 /HPF / Bacteria: Present /HPF      CAPILLARY BLOOD GLUCOSE      POCT Blood Glucose.: 262 mg/dL (14 Oct 2022 21:44)      RADIOLOGY & ADDITIONAL TESTS: Reviewed. ***ACCEPTANCE FROM MICU TO TELEMETRY***    Hospital course:  55 yo M with PMH significant for 40+ pack year smoking history, HTN (untreated), SAVANNAH (no longer on CPAP after significant 100+ lb intentional wt loss), alcohol abuse (5+ mixed drinks daily), presenting from [facility] after pt was found to be unresponsive in [place] station after a night of drinking during a company party. Unclear of amount of alcohol intake, however pt was BIBEMS after called by friends after pt passing out and unable to be awoken. BLS crew gave multiple doses Narcan with no response. On ED arrival, given additional 2 mg IV Narcan with some response as pt was able to be aroused by sternal rub and did not require intubation which was in process of being set up due to altered mental status. Utox+ alcohol (@ 159 mg/dl) and THC. ABG acidotic w/ Ph7.26 with hypercarbia (CO2: 57), patient placed on Bipap for respiratory support. Patient was arousable on exam however remained intermittently lethargic and confused with no recollection of the events overnight. AOx1-2 and repeatedly turned to wife for medical and interval history. He denies any substance use besides marijuana and alcohol. Wife at bedside denies history of seizures, previous withdrawal or hospitalizations. CIWA protocol in place, started high dose thiamine for 3 days (10/14-10/16), and multivitamin daily. Started Prednisone 40 mg for 5 days (10/14-10/18) for undiagnosed COPD w/ 40 pack year hx. He remains on BiPAP due to intermittent lethargy when placed on nasal canula. He was hypertensive during the day to BP 190s/100s and given Labetalol 5mg push and amlodipine 5mg. His CO2 was not improving while on Bipap, so Narcan .4mg  was given.  He was given an additional Narcan .8mg during the day. Bedside US showed mosaic pattern- likely air trapping. He was given Labetalol 10mg for elevated BP over 200. Started standing Amlodipine 10mg qd.      OVERNIGHT EVENTS/ Subjective HPI:   Overnight, he remained hypertensive and was given IV hydral 5mg. He remained on Bipap overnight due to admission w/ hypercarbic respiratory failure and likely history of COPD. He is eating dinner in chair and comfortable.     VITAL SIGNS:  Vital Signs Last 24 Hrs  T(C): 36.6 (14 Oct 2022 17:25), Max: 37.1 (14 Oct 2022 06:00)  T(F): 97.9 (14 Oct 2022 17:25), Max: 98.8 (14 Oct 2022 06:00)  HR: 68 (14 Oct 2022 20:15) (68 - 112)  BP: 184/80 (14 Oct 2022 20:15) (111/65 - 219/94)  BP(mean): 115 (14 Oct 2022 20:15) (96 - 135)  RR: 16 (14 Oct 2022 20:15) (5 - 25)  SpO2: 95% (14 Oct 2022 20:15) (86% - 100%)    Parameters below as of 14 Oct 2022 20:15  Patient On (Oxygen Delivery Method): nasal cannula  O2 Flow (L/min): 2      PHYSICAL EXAM:  Constitutional: NAD, eating dinner;  HEENT: NC/AT, pinpoint pupils with sluggish reaction; EOMI, no conjunctival pallor or scleral icterus, MMM  Neck: Supple, no JVD  Respiratory: Expiratory wheezing and rhonchi of upper lung fields, Decreased breath sounds at b/l lower bases   Cardiovascular: RRR, Grade 2 systolic murmur   Gastrointestinal: +BS, soft NTND, no guarding or rebound tenderness, no palpable masses   Extremities: wwp; no cyanosis, clubbing or edema.   Vascular: Pulses equal and strong throughout.   Neurological: AAOx3, no CN deficits, strength and sensation intact throughout.   Skin: No gross skin abnormalities or rashes    MEDICATIONS:  MEDICATIONS  (STANDING):  albuterol/ipratropium for Nebulization 3 milliLiter(s) Nebulizer every 4 hours  amLODIPine   Tablet 10 milliGRAM(s) Oral every 24 hours  chlorhexidine 2% Cloths 1 Application(s) Topical <User Schedule>  dextrose 5%. 1000 milliLiter(s) (100 mL/Hr) IV Continuous <Continuous>  dextrose 5%. 1000 milliLiter(s) (50 mL/Hr) IV Continuous <Continuous>  dextrose 50% Injectable 25 Gram(s) IV Push once  dextrose 50% Injectable 12.5 Gram(s) IV Push once  dextrose 50% Injectable 25 Gram(s) IV Push once  glucagon  Injectable 1 milliGRAM(s) IntraMuscular once  heparin   Injectable 5000 Unit(s) SubCutaneous every 8 hours  influenza   Vaccine 0.5 milliLiter(s) IntraMuscular once  insulin lispro (ADMELOG) corrective regimen sliding scale   SubCutaneous Before meals and at bedtime  multivitamin 1 Tablet(s) Oral daily  predniSONE   Tablet 40 milliGRAM(s) Oral daily  thiamine IVPB 500 milliGRAM(s) IV Intermittent every 8 hours    MEDICATIONS  (PRN):  dextrose Oral Gel 15 Gram(s) Oral once PRN Blood Glucose LESS THAN 70 milliGRAM(s)/deciliter      ALLERGIES:  Allergies    No Known Allergies    Intolerances        LABS:                        15.4   12.58 )-----------( 180      ( 14 Oct 2022 07:38 )             46.7     10-14    143  |  107  |  26<H>  ----------------------------<  119<H>  4.7   |  26  |  0.96    Ca    9.1      14 Oct 2022 14:59  Phos  4.8     10-14  Mg     1.9     10-    TPro  7.3  /  Alb  4.5  /  TBili  0.6  /  DBili  x   /  AST  28  /  ALT  24  /  AlkPhos  122<H>  10-14    PT/INR - ( 14 Oct 2022 07:38 )   PT: 11.7 sec;   INR: 0.98          PTT - ( 14 Oct 2022 07:38 )  PTT:30.1 sec  Urinalysis Basic - ( 13 Oct 2022 22:56 )    Color: Yellow / Appearance: Clear / S.025 / pH: x  Gluc: x / Ketone: NEGATIVE  / Bili: NEGATIVE / Urobili: 0.2 E.U./dL   Blood: x / Protein: 100 mg/dL / Nitrite: NEGATIVE   Leuk Esterase: Small / RBC: < 5 /HPF / WBC > 10 /HPF   Sq Epi: x / Non Sq Epi: 0-5 /HPF / Bacteria: Present /HPF      CAPILLARY BLOOD GLUCOSE      POCT Blood Glucose.: 262 mg/dL (14 Oct 2022 21:44)      RADIOLOGY & ADDITIONAL TESTS: Reviewed. ***ACCEPTANCE FROM MICU TO TELEMETRY***    Hospital course:  55 yo M with PMH significant for 40+ pack year smoking history, HTN (untreated), SAVANNAH (no longer on CPAP after significant 100+ lb intentional wt loss), alcohol abuse (5+ mixed drinks daily), presenting from [facility] after pt was found to be unresponsive in [place] station after a night of drinking during a company party. Unclear of amount of alcohol intake, however pt was BIBEMS after called by friends after pt passing out and unable to be awoken. BLS crew gave multiple doses Narcan with no response. On ED arrival, given additional 2 mg IV Narcan with some response as pt was able to be aroused by sternal rub and did not require intubation which was in process of being set up due to altered mental status. Utox+ alcohol (@ 159 mg/dl) and THC. ABG acidotic w/ Ph7.26 with hypercarbia (CO2: 57), patient placed on Bipap for respiratory support. Patient was arousable on exam however remained intermittently lethargic and confused with no recollection of the events overnight. AOx1-2 and repeatedly turned to wife for medical and interval history. He denies any substance use besides marijuana and alcohol. Wife at bedside denies history of seizures, previous withdrawal or hospitalizations. CIWA protocol in place, started high dose thiamine for 3 days (10/14-10/16), and multivitamin daily. Started Prednisone 40 mg for 5 days (10/14-10/18) for undiagnosed COPD w/ 40 pack year hx. He remains on BiPAP due to intermittent lethargy when placed on nasal canula. He was hypertensive during the day to BP 190s/100s and given Labetalol 5mg push and amlodipine 5mg. His CO2 was not improving while on Bipap, so Narcan .4mg  was given.  He was given an additional Narcan .8mg during the day. Bedside US showed mosaic pattern- likely air trapping. He was given Labetalol 10mg for elevated BP over 200. Started standing Amlodipine 10mg qd.      OVERNIGHT EVENTS/ Subjective HPI:   Overnight, he remained hypertensive and was given IV hydral 5mg. He remained on Bipap overnight due to admission w/ hypercarbic respiratory failure and likely history of COPD. He is eating dinner in chair and comfortable.     VITAL SIGNS:  Vital Signs Last 24 Hrs  T(C): 36.6 (14 Oct 2022 17:25), Max: 37.1 (14 Oct 2022 06:00)  T(F): 97.9 (14 Oct 2022 17:25), Max: 98.8 (14 Oct 2022 06:00)  HR: 68 (14 Oct 2022 20:15) (68 - 112)  BP: 184/80 (14 Oct 2022 20:15) (111/65 - 219/94)  BP(mean): 115 (14 Oct 2022 20:15) (96 - 135)  RR: 16 (14 Oct 2022 20:15) (5 - 25)  SpO2: 95% (14 Oct 2022 20:15) (86% - 100%)    Parameters below as of 14 Oct 2022 20:15  Patient On (Oxygen Delivery Method): nasal cannula  O2 Flow (L/min): 2      PHYSICAL EXAM:  Constitutional: NAD, eating dinner;  HEENT: NC/AT; EOMI, no conjunctival pallor or scleral icterus, MMM  Neck: Supple, no JVD  Respiratory: Expiratory wheezing and rhonchi of upper lung fields, Decreased breath sounds at b/l lower bases   Cardiovascular: RRR, Grade 2 systolic murmur;  Gastrointestinal: +BS, soft NTND, no guarding or rebound tenderness, no palpable masses   Extremities: wwp; no cyanosis, clubbing or edema.   Vascular: Pulses equal and strong throughout.   Neurological: AAOx3, no CN deficits, strength and sensation intact throughout.   Skin: No gross skin abnormalities or rashes    MEDICATIONS:  MEDICATIONS  (STANDING):  albuterol/ipratropium for Nebulization 3 milliLiter(s) Nebulizer every 4 hours  amLODIPine   Tablet 10 milliGRAM(s) Oral every 24 hours  chlorhexidine 2% Cloths 1 Application(s) Topical <User Schedule>  dextrose 5%. 1000 milliLiter(s) (100 mL/Hr) IV Continuous <Continuous>  dextrose 5%. 1000 milliLiter(s) (50 mL/Hr) IV Continuous <Continuous>  dextrose 50% Injectable 25 Gram(s) IV Push once  dextrose 50% Injectable 12.5 Gram(s) IV Push once  dextrose 50% Injectable 25 Gram(s) IV Push once  glucagon  Injectable 1 milliGRAM(s) IntraMuscular once  heparin   Injectable 5000 Unit(s) SubCutaneous every 8 hours  influenza   Vaccine 0.5 milliLiter(s) IntraMuscular once  insulin lispro (ADMELOG) corrective regimen sliding scale   SubCutaneous Before meals and at bedtime  multivitamin 1 Tablet(s) Oral daily  predniSONE   Tablet 40 milliGRAM(s) Oral daily  thiamine IVPB 500 milliGRAM(s) IV Intermittent every 8 hours    MEDICATIONS  (PRN):  dextrose Oral Gel 15 Gram(s) Oral once PRN Blood Glucose LESS THAN 70 milliGRAM(s)/deciliter      ALLERGIES:  Allergies    No Known Allergies    Intolerances        LABS:                        15.4   12.58 )-----------( 180      ( 14 Oct 2022 07:38 )             46.7     10-14    143  |  107  |  26<H>  ----------------------------<  119<H>  4.7   |  26  |  0.96    Ca    9.1      14 Oct 2022 14:59  Phos  4.8     10-14  Mg     1.9     10-14    TPro  7.3  /  Alb  4.5  /  TBili  0.6  /  DBili  x   /  AST  28  /  ALT  24  /  AlkPhos  122<H>  10-14    PT/INR - ( 14 Oct 2022 07:38 )   PT: 11.7 sec;   INR: 0.98          PTT - ( 14 Oct 2022 07:38 )  PTT:30.1 sec  Urinalysis Basic - ( 13 Oct 2022 22:56 )    Color: Yellow / Appearance: Clear / S.025 / pH: x  Gluc: x / Ketone: NEGATIVE  / Bili: NEGATIVE / Urobili: 0.2 E.U./dL   Blood: x / Protein: 100 mg/dL / Nitrite: NEGATIVE   Leuk Esterase: Small / RBC: < 5 /HPF / WBC > 10 /HPF   Sq Epi: x / Non Sq Epi: 0-5 /HPF / Bacteria: Present /HPF      CAPILLARY BLOOD GLUCOSE      POCT Blood Glucose.: 262 mg/dL (14 Oct 2022 21:44)      RADIOLOGY & ADDITIONAL TESTS: Reviewed. ***ACCEPTANCE FROM MICU TO TELEMETRY***    Hospital course:  55 yo M with PMH significant for 40+ pack year smoking history, HTN (untreated), SAVANNAH (no longer on CPAP after significant 100+ lb intentional wt loss), alcohol abuse (5+ mixed drinks daily), presenting from [facility] after pt was found to be unresponsive in [place] station after a night of drinking during a company party. Unclear of amount of alcohol intake, however pt was BIBEMS after called by friends after pt passing out and unable to be awoken. BLS crew gave multiple doses of Narcan with no response. On ED arrival, given additional 2 mg IV Narcan with some response as pt was able to be aroused by sternal rub and did not require intubation which was in process of being set up due to altered mental status. Utox+ alcohol (@ 159 mg/dl) and THC. ABG acidotic w/ Ph7.26 with hypercarbia (CO2: 57), patient placed on Bipap for respiratory support. Patient was arousable on exam however remained intermittently lethargic and confused with no recollection of the events overnight. AOx1-2 and repeatedly turned to wife for medical and interval history. He denies any substance use besides marijuana and alcohol. Wife at bedside denies history of seizures, previous withdrawal or hospitalizations. CIWA protocol in place, started high dose thiamine for 3 days (10/14-10/16), and multivitamin daily. Started Prednisone 40 mg for 5 days (10/14-10/18) for undiagnosed COPD w/ 40 pack year hx. He remains on BiPAP due to intermittent lethargy when placed on nasal canula. He was hypertensive during the day to BP 190s/100s and given Labetalol 5mg push and amlodipine 5mg. His CO2 was not improving while on Bipap, so Narcan .4mg  was given. He was given an additional Narcan .8mg during the day. Bedside US showed mosaic pattern- likely air trapping. Later in the day, he was given Labetalol 10mg for elevated BP over 200. Overnight, we started standing Amlodipine 10mg qd. He was transferred to telemetry and remained on BiPAP overnight.    OVERNIGHT EVENTS/ Subjective HPI:   Overnight, he remained hypertensive and was given IV hydral 5mg. He remained on Bipap overnight due to admission w/ hypercarbic respiratory failure and likely history of COPD. He is eating dinner in chair and comfortable. No complaints. Denies headache, chills, abdominal pain, and diarrhea.    VITAL SIGNS:  Vital Signs Last 24 Hrs  T(C): 36.6 (14 Oct 2022 17:25), Max: 37.1 (14 Oct 2022 06:00)  T(F): 97.9 (14 Oct 2022 17:25), Max: 98.8 (14 Oct 2022 06:00)  HR: 68 (14 Oct 2022 20:15) (68 - 112)  BP: 184/80 (14 Oct 2022 20:15) (111/65 - 219/94)  BP(mean): 115 (14 Oct 2022 20:15) (96 - 135)  RR: 16 (14 Oct 2022 20:15) (5 - 25)  SpO2: 95% (14 Oct 2022 20:15) (86% - 100%)    Parameters below as of 14 Oct 2022 20:15  Patient On (Oxygen Delivery Method): nasal cannula  O2 Flow (L/min): 2      PHYSICAL EXAM:  Constitutional: NAD, eating dinner;  HEENT: NC/AT; EOMI, no conjunctival pallor or scleral icterus, MMM  Neck: Supple, no JVD  Respiratory: Expiratory wheezing and rhonchi of upper lung fields, Decreased breath sounds at b/l lower bases   Cardiovascular: RRR, Grade 2 systolic murmur;  Gastrointestinal: +BS, soft NTND, no guarding or rebound tenderness, no palpable masses   Extremities: wwp; no cyanosis, clubbing or edema.   Vascular: Pulses equal and strong throughout.   Neurological: AAOx3, no CN deficits, strength and sensation intact throughout.   Skin: No gross skin abnormalities or rashes    MEDICATIONS:  MEDICATIONS  (STANDING):  albuterol/ipratropium for Nebulization 3 milliLiter(s) Nebulizer every 4 hours  amLODIPine   Tablet 10 milliGRAM(s) Oral every 24 hours  chlorhexidine 2% Cloths 1 Application(s) Topical <User Schedule>  dextrose 5%. 1000 milliLiter(s) (100 mL/Hr) IV Continuous <Continuous>  dextrose 5%. 1000 milliLiter(s) (50 mL/Hr) IV Continuous <Continuous>  dextrose 50% Injectable 25 Gram(s) IV Push once  dextrose 50% Injectable 12.5 Gram(s) IV Push once  dextrose 50% Injectable 25 Gram(s) IV Push once  glucagon  Injectable 1 milliGRAM(s) IntraMuscular once  heparin   Injectable 5000 Unit(s) SubCutaneous every 8 hours  influenza   Vaccine 0.5 milliLiter(s) IntraMuscular once  insulin lispro (ADMELOG) corrective regimen sliding scale   SubCutaneous Before meals and at bedtime  multivitamin 1 Tablet(s) Oral daily  predniSONE   Tablet 40 milliGRAM(s) Oral daily  thiamine IVPB 500 milliGRAM(s) IV Intermittent every 8 hours    MEDICATIONS  (PRN):  dextrose Oral Gel 15 Gram(s) Oral once PRN Blood Glucose LESS THAN 70 milliGRAM(s)/deciliter      ALLERGIES:  Allergies    No Known Allergies    Intolerances        LABS:                        15.4   12.58 )-----------( 180      ( 14 Oct 2022 07:38 )             46.7     10-14    143  |  107  |  26<H>  ----------------------------<  119<H>  4.7   |  26  |  0.96    Ca    9.1      14 Oct 2022 14:59  Phos  4.8     10-  Mg     1.9     10-14    TPro  7.3  /  Alb  4.5  /  TBili  0.6  /  DBili  x   /  AST  28  /  ALT  24  /  AlkPhos  122<H>  10-14    PT/INR - ( 14 Oct 2022 07:38 )   PT: 11.7 sec;   INR: 0.98          PTT - ( 14 Oct 2022 07:38 )  PTT:30.1 sec  Urinalysis Basic - ( 13 Oct 2022 22:56 )    Color: Yellow / Appearance: Clear / S.025 / pH: x  Gluc: x / Ketone: NEGATIVE  / Bili: NEGATIVE / Urobili: 0.2 E.U./dL   Blood: x / Protein: 100 mg/dL / Nitrite: NEGATIVE   Leuk Esterase: Small / RBC: < 5 /HPF / WBC > 10 /HPF   Sq Epi: x / Non Sq Epi: 0-5 /HPF / Bacteria: Present /HPF      CAPILLARY BLOOD GLUCOSE      POCT Blood Glucose.: 262 mg/dL (14 Oct 2022 21:44)      RADIOLOGY & ADDITIONAL TESTS: Reviewed.

## 2022-10-14 NOTE — ED PROVIDER NOTE - CARE PLAN
1 Principal Discharge DX:	Acute respiratory failure with hypoxia  Secondary Diagnosis:	Prerenal azotemia

## 2022-10-14 NOTE — ED PROVIDER NOTE - CLINICAL SUMMARY MEDICAL DECISION MAKING FREE TEXT BOX
IV, O2, monitor, pulse ox, FS.      Narcan 2mg given IV given pinpoint pupils.  Delayed response.  We will perform a full AMS work up.  HCT, C-spine CT although no e/o trauma on exam, CXR, full labs, EKG, U tox.

## 2022-10-14 NOTE — ED PROVIDER NOTE - CRITICAL CARE ATTENDING CONTRIBUTION TO CARE
The patient was seen immediately upon arrival due to a high probability of imminent or life-threatening deterioration secondary to hypoxic respiratory failure, which required my direct attention, intervention, and personal management at the bedside. I have personally provided critical care time exclusive of time spent on separately billable procedures. Time includes review of laboratory data, radiology results, discussion with consultants, discussion with the patient's family, and monitoring for potential decompensation.

## 2022-10-14 NOTE — DIETITIAN INITIAL EVALUATION ADULT - ADD RECOMMEND
-Initiate oral diet when medically able   *Recommend regular diet with appropriate textures/consistencies   -Monitor renal fxn; elevated K and Phos today   -Encourage good PO intake   -Monitor chemistry, GI fxn, and skin integrity

## 2022-10-14 NOTE — DIETITIAN INITIAL EVALUATION ADULT - OTHER CALCULATIONS
IBW used to calculate needs due to pt's current body weight exceeding 120% of IBW adjusted for age   -Estimated ht of 72" used to determine IBW d/t no ht value yet in EMR

## 2022-10-14 NOTE — DIETITIAN INITIAL EVALUATION ADULT - PERTINENT MEDS FT
MEDICATIONS  (STANDING):  albuterol/ipratropium for Nebulization 3 milliLiter(s) Nebulizer every 4 hours  chlorhexidine 2% Cloths 1 Application(s) Topical <User Schedule>  dextrose 5%. 1000 milliLiter(s) (100 mL/Hr) IV Continuous <Continuous>  dextrose 5%. 1000 milliLiter(s) (50 mL/Hr) IV Continuous <Continuous>  dextrose 50% Injectable 25 Gram(s) IV Push once  dextrose 50% Injectable 12.5 Gram(s) IV Push once  dextrose 50% Injectable 25 Gram(s) IV Push once  glucagon  Injectable 1 milliGRAM(s) IntraMuscular once  heparin   Injectable 5000 Unit(s) SubCutaneous every 8 hours  insulin lispro (ADMELOG) corrective regimen sliding scale   SubCutaneous Before meals and at bedtime  multivitamin 1 Tablet(s) Oral daily  predniSONE   Tablet 40 milliGRAM(s) Oral daily  thiamine IVPB 500 milliGRAM(s) IV Intermittent every 8 hours    MEDICATIONS  (PRN):  dextrose Oral Gel 15 Gram(s) Oral once PRN Blood Glucose LESS THAN 70 milliGRAM(s)/deciliter

## 2022-10-14 NOTE — DIETITIAN INITIAL EVALUATION ADULT - PERTINENT LABORATORY DATA
10-14    143  |  109<H>  |  30<H>  ----------------------------<  131<H>  5.4<H>   |  23  |  1.20    Ca    8.6      14 Oct 2022 07:38  Phos  4.8     10-14  Mg     1.9     10-14    TPro  7.4  /  Alb  4.9  /  TBili  0.3  /  DBili  x   /  AST  32  /  ALT  26  /  AlkPhos  117  10-14  POCT Blood Glucose.: 103 mg/dL (10-14-22 @ 13:58)

## 2022-10-14 NOTE — ED PROVIDER NOTE - PROGRESS NOTE DETAILS
Respiratory failure with hypoxia and possible hypoxic brain injury.  HCT negative.  Pt with memory difficulties.  It is possible that patient was drinking earlier today, blacked out and aspirated causing aspiration and subsequent hypoxia.  Also note some pre-renal azotemia.  No e/o etoh w/d at this time.      TSH noted to be elevated.  Reports Fhx of hypothyroid - mother.      We will admit for hypoxia as he requires 4L NC to stay 95%-97%.  Also, when he falls asleep he desats to 90/91%.  Will obtain CTA chest to evaluated for PE/aspriation/consolidations.      Case discussed with Dr. Roy of [facility].  Accepts pt to step down unit. Pt required multiple repeated tactile stimulation to keep him awake with adequate oxygenation.  Sister reports a h/o SAVANNAH but does not use CPAP.  Placed on Bipap here with improvement of oxygenation.  Blood gas repeated after ~1 hour of bipap and concerning for CO2 retention and respiratory acidosis.  Re-consulted ICU and will upgrade pt to MICU bed.  Changed to tier 1 transfer.

## 2022-10-14 NOTE — ED PROVIDER NOTE - PHYSICAL EXAMINATION
On Arrival:    CONSTITUTIONAL: Well-developed; well-nourished; Unresponsive to painful stimuli.  Breathing on his own with RR at ~16.  Oral airway in without gag reflex.   SKIN: Skin is warm and dry, no acute rash.  No e/o lacs, abrasions, or ecchymoses.   HEAD: Normocephalic; atraumatic.  EYES: Pinpoint pupils bilaterally.    ENT: No nasal discharge; airway clear.  NECK: Supple; non tender.  CARD: S1, S2 normal; no murmurs, gallops, or rubs. Regular rate and rhythm.  RESP: B/L Rhoncorous BS.  Equal b/l.   ABD: Normal bowel sounds; soft; non-distended; non-tender.  MSK: Normal ROM. No clubbing, cyanosis or edema.  No e/o trauma.   NEURO: Initially unresponsive to pain.  Then responsive to sternal rub but confused.  GCS goes from ~8 to 14.   PSYCH: Cooperative, appropriate.

## 2022-10-14 NOTE — PROGRESS NOTE ADULT - PROBLEM SELECTOR PLAN 6
TSH 16 on admission. Admits to family hx of thyroid disorder however denies personal hx. Asymptomatic at this time   -F/u T3, T4  -Consider endo consult TSH 16 on admission. Admits to family hx of thyroid disorder however denies personal hx. Asymptomatic at this time. Likely elevated TSH iso critically ill patient.   - free T4 7.38 (normal)  - f/u endo outpatient

## 2022-10-14 NOTE — DIETITIAN INITIAL EVALUATION ADULT - OTHER INFO
55 yo M with PMH significant for 40+ pack year smoking history, HTN (untreated), SAVANNAH (no longer on CPAP after significant 100+ lb intentional wt loss), alcohol abuse (5+ mixed drinks daily) presenting from [facility] with altered mental status iso alcohol intoxication, admitted for hypercarbic respiratory failure likely 2/2 alcohol intoxication vs possible superimposed opioid overdose.     Pt care discussed in IDT rounds. Rx and labs reviewed. Pt presents with no overt signs of nutritional wasting. Pt reports a UBW of 226 lbs; CBW of 229 lbs. Pt states that in the last 2 years, he has lost ~120 lbs 2/2 diet changes and increased physical activity; no unintentional wt loss endorsed. Plan to start pt on PO diet today; RDN will continue to monitor oral intake and ability to meet needs. No c/o NVCD or difficult chew/swallow. NKA to food.     Pain: 0 per chart review   GI: Abdomen ND/NT, +BS x4, LBM 10/13  Skin: WDI, no edema noted

## 2022-10-14 NOTE — PROGRESS NOTE ADULT - ASSESSMENT
55 yo M with PMH significant for 40+ pack year smoking history, HTN (untreated), SAVANNAH (no longer on CPAP after significant 100+ lb intentional wt loss), alcohol abuse (5+ mixed drinks daily) presenting from [facility] with altered mental status iso alcohol intoxication, admitted for hypercarbic respiratory failure likely 2/2 alcohol intoxication vs possible superimposed opioid overdose.           55 yo M with PMH significant for 40+ pack year smoking history, HTN (untreated), SAVANNAH (no longer on CPAP after significant 100+ lb intentional wt loss), alcohol abuse (5+ mixed drinks daily) presenting from [facility] with altered mental status iso alcohol intoxication, admitted for hypercarbic respiratory failure likely 2/2 alcohol intoxication vs possible superimposed opioid overdose.

## 2022-10-14 NOTE — PROGRESS NOTE ADULT - PROBLEM SELECTOR PLAN 4
[facility]                                                                       Query Response Note      PATIENT:               PEPE SANDOVAL  ACCT #:                  [id number]  MRN:                     [id number]  :                      History of untreated HTN with hypertensive urgency with systolic 180-200s. S/p labetalol 10 mg x1, 5 mg x1. Asymptomatic  -Started amlodipine 5 mg qd History of untreated HTN with hypertensive urgency with systolic 180-200s. S/p labetalol 10 mg x1, 5mg x1.  Asymptomatic  -Started amlodipine 5 mg qd

## 2022-10-15 ENCOUNTER — TRANSCRIPTION ENCOUNTER (OUTPATIENT)
Age: 57
End: 2022-10-15

## 2022-10-15 LAB
A1C WITH ESTIMATED AVERAGE GLUCOSE RESULT: 4.7 % — SIGNIFICANT CHANGE UP (ref 4–5.6)
ANION GAP SERPL CALC-SCNC: 7 MMOL/L — SIGNIFICANT CHANGE UP (ref 5–17)
ANION GAP SERPL CALC-SCNC: 8 MMOL/L — SIGNIFICANT CHANGE UP (ref 5–17)
BASOPHILS # BLD AUTO: 0.01 K/UL — SIGNIFICANT CHANGE UP (ref 0–0.2)
BASOPHILS NFR BLD AUTO: 0.1 % — SIGNIFICANT CHANGE UP (ref 0–2)
BUN SERPL-MCNC: 21 MG/DL — SIGNIFICANT CHANGE UP (ref 7–23)
BUN SERPL-MCNC: 24 MG/DL — HIGH (ref 7–23)
CALCIUM SERPL-MCNC: 8.6 MG/DL — SIGNIFICANT CHANGE UP (ref 8.4–10.5)
CALCIUM SERPL-MCNC: 9 MG/DL — SIGNIFICANT CHANGE UP (ref 8.4–10.5)
CHLORIDE SERPL-SCNC: 101 MMOL/L — SIGNIFICANT CHANGE UP (ref 96–108)
CHLORIDE SERPL-SCNC: 105 MMOL/L — SIGNIFICANT CHANGE UP (ref 96–108)
CO2 SERPL-SCNC: 28 MMOL/L — SIGNIFICANT CHANGE UP (ref 22–31)
CO2 SERPL-SCNC: 29 MMOL/L — SIGNIFICANT CHANGE UP (ref 22–31)
CREAT SERPL-MCNC: 0.95 MG/DL — SIGNIFICANT CHANGE UP (ref 0.5–1.3)
CREAT SERPL-MCNC: 1.03 MG/DL — SIGNIFICANT CHANGE UP (ref 0.5–1.3)
CULTURE RESULTS: NO GROWTH — SIGNIFICANT CHANGE UP
EGFR: 84 ML/MIN/1.73M2 — SIGNIFICANT CHANGE UP
EGFR: 93 ML/MIN/1.73M2 — SIGNIFICANT CHANGE UP
EOSINOPHIL # BLD AUTO: 0.02 K/UL — SIGNIFICANT CHANGE UP (ref 0–0.5)
EOSINOPHIL NFR BLD AUTO: 0.2 % — SIGNIFICANT CHANGE UP (ref 0–6)
ESTIMATED AVERAGE GLUCOSE: 88 MG/DL — SIGNIFICANT CHANGE UP (ref 68–114)
GLUCOSE BLDC GLUCOMTR-MCNC: 110 MG/DL — HIGH (ref 70–99)
GLUCOSE BLDC GLUCOMTR-MCNC: 114 MG/DL — HIGH (ref 70–99)
GLUCOSE BLDC GLUCOMTR-MCNC: 114 MG/DL — HIGH (ref 70–99)
GLUCOSE BLDC GLUCOMTR-MCNC: 116 MG/DL — HIGH (ref 70–99)
GLUCOSE SERPL-MCNC: 111 MG/DL — HIGH (ref 70–99)
GLUCOSE SERPL-MCNC: 111 MG/DL — HIGH (ref 70–99)
HCT VFR BLD CALC: 44.4 % — SIGNIFICANT CHANGE UP (ref 39–50)
HCV AB S/CO SERPL IA: 0.03 S/CO — SIGNIFICANT CHANGE UP
HCV AB SERPL-IMP: SIGNIFICANT CHANGE UP
HGB BLD-MCNC: 14.5 G/DL — SIGNIFICANT CHANGE UP (ref 13–17)
IMM GRANULOCYTES NFR BLD AUTO: 0.3 % — SIGNIFICANT CHANGE UP (ref 0–0.9)
LYMPHOCYTES # BLD AUTO: 1.39 K/UL — SIGNIFICANT CHANGE UP (ref 1–3.3)
LYMPHOCYTES # BLD AUTO: 14.4 % — SIGNIFICANT CHANGE UP (ref 13–44)
MAGNESIUM SERPL-MCNC: 2.1 MG/DL — SIGNIFICANT CHANGE UP (ref 1.6–2.6)
MCHC RBC-ENTMCNC: 32.2 PG — SIGNIFICANT CHANGE UP (ref 27–34)
MCHC RBC-ENTMCNC: 32.7 GM/DL — SIGNIFICANT CHANGE UP (ref 32–36)
MCV RBC AUTO: 98.7 FL — SIGNIFICANT CHANGE UP (ref 80–100)
MONOCYTES # BLD AUTO: 1 K/UL — HIGH (ref 0–0.9)
MONOCYTES NFR BLD AUTO: 10.3 % — SIGNIFICANT CHANGE UP (ref 2–14)
NEUTROPHILS # BLD AUTO: 7.22 K/UL — SIGNIFICANT CHANGE UP (ref 1.8–7.4)
NEUTROPHILS NFR BLD AUTO: 74.7 % — SIGNIFICANT CHANGE UP (ref 43–77)
NRBC # BLD: 0 /100 WBCS — SIGNIFICANT CHANGE UP (ref 0–0)
PHOSPHATE SERPL-MCNC: 3 MG/DL — SIGNIFICANT CHANGE UP (ref 2.5–4.5)
PLATELET # BLD AUTO: 144 K/UL — LOW (ref 150–400)
POTASSIUM SERPL-MCNC: 4.4 MMOL/L — SIGNIFICANT CHANGE UP (ref 3.5–5.3)
POTASSIUM SERPL-MCNC: 4.7 MMOL/L — SIGNIFICANT CHANGE UP (ref 3.5–5.3)
POTASSIUM SERPL-SCNC: 4.4 MMOL/L — SIGNIFICANT CHANGE UP (ref 3.5–5.3)
POTASSIUM SERPL-SCNC: 4.7 MMOL/L — SIGNIFICANT CHANGE UP (ref 3.5–5.3)
RBC # BLD: 4.5 M/UL — SIGNIFICANT CHANGE UP (ref 4.2–5.8)
RBC # FLD: 12.9 % — SIGNIFICANT CHANGE UP (ref 10.3–14.5)
SODIUM SERPL-SCNC: 138 MMOL/L — SIGNIFICANT CHANGE UP (ref 135–145)
SODIUM SERPL-SCNC: 140 MMOL/L — SIGNIFICANT CHANGE UP (ref 135–145)
SPECIMEN SOURCE: SIGNIFICANT CHANGE UP
TROPONIN T SERPL-MCNC: 0.04 NG/ML — CRITICAL HIGH (ref 0–0.01)
WBC # BLD: 9.67 K/UL — SIGNIFICANT CHANGE UP (ref 3.8–10.5)
WBC # FLD AUTO: 9.67 K/UL — SIGNIFICANT CHANGE UP (ref 3.8–10.5)

## 2022-10-15 PROCEDURE — 99233 SBSQ HOSP IP/OBS HIGH 50: CPT | Mod: GC

## 2022-10-15 RX ORDER — LABETALOL HCL 100 MG
5 TABLET ORAL ONCE
Refills: 0 | Status: COMPLETED | OUTPATIENT
Start: 2022-10-15 | End: 2022-10-15

## 2022-10-15 RX ORDER — DIPHENHYDRAMINE HCL 50 MG
25 CAPSULE ORAL EVERY 6 HOURS
Refills: 0 | Status: DISCONTINUED | OUTPATIENT
Start: 2022-10-15 | End: 2022-10-16

## 2022-10-15 RX ORDER — HYDRALAZINE HCL 50 MG
5 TABLET ORAL ONCE
Refills: 0 | Status: COMPLETED | OUTPATIENT
Start: 2022-10-15 | End: 2022-10-15

## 2022-10-15 RX ADMIN — Medication 1 PATCH: at 23:11

## 2022-10-15 RX ADMIN — Medication 25 MILLIGRAM(S): at 15:28

## 2022-10-15 RX ADMIN — Medication 3 MILLILITER(S): at 19:47

## 2022-10-15 RX ADMIN — Medication 40 MILLIGRAM(S): at 05:49

## 2022-10-15 RX ADMIN — Medication 105 MILLIGRAM(S): at 22:08

## 2022-10-15 RX ADMIN — Medication 105 MILLIGRAM(S): at 05:49

## 2022-10-15 RX ADMIN — Medication 1 TABLET(S): at 12:44

## 2022-10-15 RX ADMIN — Medication 1 PATCH: at 06:13

## 2022-10-15 RX ADMIN — Medication 5 MILLIGRAM(S): at 22:07

## 2022-10-15 RX ADMIN — CHLORHEXIDINE GLUCONATE 1 APPLICATION(S): 213 SOLUTION TOPICAL at 05:47

## 2022-10-15 RX ADMIN — Medication 3 MILLILITER(S): at 05:49

## 2022-10-15 RX ADMIN — Medication 5 MILLIGRAM(S): at 22:08

## 2022-10-15 RX ADMIN — Medication 1 PATCH: at 19:29

## 2022-10-15 RX ADMIN — HEPARIN SODIUM 5000 UNIT(S): 5000 INJECTION INTRAVENOUS; SUBCUTANEOUS at 05:48

## 2022-10-15 RX ADMIN — Medication 1 PATCH: at 22:08

## 2022-10-15 RX ADMIN — AMLODIPINE BESYLATE 10 MILLIGRAM(S): 2.5 TABLET ORAL at 19:25

## 2022-10-15 RX ADMIN — Medication 3 MILLILITER(S): at 11:14

## 2022-10-15 RX ADMIN — Medication 3 MILLILITER(S): at 22:08

## 2022-10-15 RX ADMIN — HEPARIN SODIUM 5000 UNIT(S): 5000 INJECTION INTRAVENOUS; SUBCUTANEOUS at 22:07

## 2022-10-15 RX ADMIN — HEPARIN SODIUM 5000 UNIT(S): 5000 INJECTION INTRAVENOUS; SUBCUTANEOUS at 14:58

## 2022-10-15 RX ADMIN — Medication 5 MILLIGRAM(S): at 09:36

## 2022-10-15 RX ADMIN — Medication 3 MILLILITER(S): at 15:28

## 2022-10-15 RX ADMIN — Medication 105 MILLIGRAM(S): at 14:58

## 2022-10-15 NOTE — SWALLOW BEDSIDE ASSESSMENT ADULT - SWALLOW EVAL: DIAGNOSIS
Pt presents as essentially functional for regular diet with thin liquids. + toleration observed without overt symptoms of penetration/aspiration

## 2022-10-15 NOTE — PROGRESS NOTE ADULT - PROBLEM/PLAN-5
Findings discussed with patient in detail. Pt will closely monitor symptoms for any change and understands the importance of prompt outpatient follow up and will return if worse.    
DISPLAY PLAN FREE TEXT
DISPLAY PLAN FREE TEXT

## 2022-10-15 NOTE — PROVIDER CONTACT NOTE (CRITICAL VALUE NOTIFICATION) - SITUATION
troponin trending down.
Patient found down at [facility] BIB EMS, appearing unresponsive and "blue." s/p Narcan and BVM with improvement in mental status

## 2022-10-15 NOTE — PROGRESS NOTE ADULT - ASSESSMENT
55 yo M with PMH significant for 40+ pack year smoking history, HTN (untreated), SAVANNAH (no longer on CPAP after significant 100+ lb intentional wt loss), alcohol abuse (5+ mixed drinks daily) presenting from [facility] with altered mental status iso alcohol intoxication, admitted for hypercarbic respiratory failure likely 2/2 alcohol intoxication vs possible superimposed opioid overdose.

## 2022-10-15 NOTE — PROGRESS NOTE ADULT - ATTENDING COMMENTS
toxic encephalopathy, alcohol abuse, COPD   physical as above  follow BP on amlodipine  discussed smoking cessation and alcohol rehab  prednisone/bronchodilators  EEG  decision making of high complexity

## 2022-10-15 NOTE — PROGRESS NOTE ADULT - PROBLEM SELECTOR PLAN 7
F: None  E: Replete PRN  GI ppx: None   DVT ppx: Heparin sub q   Lines: peripheral  Dispo: [facility]
F: None  E: Replete PRN  GI ppx: None   DVT ppx: Heparin sub q   Lines: peripheral  Dispo: [facility]

## 2022-10-15 NOTE — PROGRESS NOTE ADULT - PROBLEM SELECTOR PLAN 2
Likely 2/2 alcohol intoxication with component of chronic retention from lung disease iso extensive smoking history. Almost intubated in ED however had response to Narcan in ED and is tolerating bipap at this time although intermittently lethargic   -C/w Bipap overnight  -Serial ABGs to monitor for improvement of acidosis/hypercarbia   -Management of AMS as above   -pH 7.22->7.34, CO2 58 -> 47 while on BiPAP    #r/o COPD   Pt likely with undiagnosed COPD with 40+ pack year history. CT 10/14: Mild paraseptal emphysema. Mosaic attenuation. Mild bibasilar subpleural reticulation. No consolidation. Hypercarbic on ABG likely d/t being chronic retainer with superimposed acidemia and hypercarbia d/t alcohol intoxication. Improving on duonebs and Bipap  -C/w Bipap with de-escalation to NC as tolerated    -Prednisone 40 mg for 5 days (10/14-10/18)   -Duonebs q4h Likely 2/2 alcohol intoxication with component of chronic retention from lung disease iso extensive smoking history. Almost intubated in ED however had response to Narcan in ED and is tolerating bipap at this time although intermittently lethargic   -C/w Bipap overnight      #r/o COPD   Pt likely with undiagnosed COPD with 40+ pack year history. CT 10/14: Mild paraseptal emphysema. Mosaic attenuation. Mild bibasilar subpleural reticulation. No consolidation. Hypercarbic on ABG likely d/t being chronic retainer with superimposed acidemia and hypercarbia d/t alcohol intoxication. Improving on duonebs and Bipap  -C/w Bipap with de-escalation to NC as tolerated    -Prednisone 40 mg for 5 days (10/14-10/18)   -Duonebs q4h

## 2022-10-15 NOTE — DISCHARGE NOTE PROVIDER - HOSPITAL COURSE
#Discharge: do not delete    55 yo M with PMH significant for 40+ pack year smoking history, HTN (untreated), SAVANNAH (no longer on CPAP after significant 100+ lb intentional wt loss), alcohol abuse (5+ mixed drinks daily) presenting from [facility] with altered mental status iso alcohol intoxication, admitted for hypercarbic respiratory failure likely 2/2 alcohol intoxication vs possible superimposed opioid overdose.    Hospital course (by problem):     #Acute Respiratory failure with hypercapnia  - Pt with AMS 2/2 hypercarbic respiratory failure iso alcohol intoxication vs possible superimposed opioid use. Found unresponsive in [place] station after admitted extensive alcohol use overnight.  - Acidotic & hypercarbic on admission. Utox+ alcohol (@ 159 mg/dl). Negative opioid panel, however pin point pupils on exam with some response to Narcan 2 mg IV in ED. Received additional narcan doses throughout the day.   - EEG ___showed no signs seizure?__________   - MS now improved, stable for discharge. Advised to continue using BIPAP outpatient.     #Possible COPD   - 40+ pack year history. CT 10/14: Mild paraseptal emphysema. Mosaic attenuation. Mild bibasilar subpleural reticulation. No consolidation.   - Respiratory function Improved on duonebs and Bipap  -Prednisone 40 mg for 5 days (10/14-10/18)   - Follow up outpatient pulmonology for PFTs, and further management.     #Alcohol intoxication  - iso heavy alcohol use. Advised pt on alcohol cessation  - continue thiamine supplementation & multivitamin    #HTN (hypertension).   - History of untreated HTN, hospital stay with multiple episodes hypertensive urgency with systolic 180-200s. S/p labetalol 10 mg x1, 5mg x2.  -Started amlodipine 10 mg qd (increased from 5mg).      Patient was discharged to: home    New medications: Amlodipine 10mg, Prednisone 40mg x ___ days  Changes to old medications: None  Medications that were stopped: None    Items to follow up as outpatient: Pulmonology outpt for PFTs    Physical exam at the time of discharge:  Constitutional: NAD,  HEENT: NC/AT; EOMI, no conjunctival pallor or scleral icterus, MMM  Neck: Supple, no JVD  Respiratory: diffuse wheezing & ronchi R>L, more prominent in upper lung fields.   Cardiovascular: RRR, +systolic murmur;  Gastrointestinal: +BS, soft NTND, mild hepatomegaly  Extremities: wwp; no cyanosis, clubbing or edema.   Vascular: Pulses equal and strong throughout.   Neurological: AAOx3, no CN deficits, strength and sensation intact throughout.   Skin: No gross skin abnormalities or rashes  CIWA: 0       #Discharge: do not delete    55 yo M with PMH significant for 40+ pack year smoking history, HTN (untreated), SAVANNAH (no longer on CPAP after significant 100+ lb intentional wt loss), alcohol abuse (5+ mixed drinks daily) presenting from [facility] with altered mental status iso alcohol intoxication, admitted for hypercarbic respiratory failure likely 2/2 alcohol intoxication vs possible superimposed opioid overdose.    Hospital course (by problem):     #Acute Respiratory failure with hypercapnia  - Pt with AMS 2/2 hypercarbic respiratory failure iso alcohol intoxication vs possible superimposed opioid use. Found unresponsive in [place] station after admitted extensive alcohol use overnight.  - Acidotic & hypercarbic on admission. Utox+ alcohol (@ 159 mg/dl). Negative opioid panel, however pin point pupils on exam with some response to Narcan 2 mg IV in ED. Received additional narcan doses throughout the day.   - EEG showed no signs seizure?  - MS now improved, stable for discharge. Advised to continue using BIPAP outpatient.     #Possible COPD   - 40+ pack year history. CT 10/14: Mild paraseptal emphysema. Mosaic attenuation. Mild bibasilar subpleural reticulation. No consolidation.   - Respiratory function Improved on duonebs and Bipap  -Prednisone 40 mg for 5 days (10/14-10/18)   - Follow up outpatient pulmonology for PFTs, and further management.   - started Symbicort and Albuterol PRN    #Alcohol intoxication  - iso heavy alcohol use. Advised pt on alcohol cessation  - continue thiamine supplementation & multivitamin    #HTN (hypertension).   - History of untreated HTN, hospital stay with multiple episodes hypertensive urgency with systolic 180-200s. S/p labetalol 10 mg x1, 5mg x2.  -Started amlodipine 10 mg qd (increased from 5mg).  -Started Losartan 25    Patient was discharged to: home    New medications: Amlodipine 10mg, Losartan 25  Changes to old medications: None  Medications that were stopped: None    Items to follow up as outpatient: Pulmonology outpt for PFTs    Physical exam at the time of discharge:  Constitutional: NAD,  HEENT: NC/AT; EOMI, no conjunctival pallor or scleral icterus, MMM  Neck: Supple, no JVD  Respiratory: diffuse wheezing & ronchi R>L, more prominent in upper lung fields.   Cardiovascular: RRR, +systolic murmur;  Gastrointestinal: +BS, soft NTND, mild hepatomegaly  Extremities: wwp; no cyanosis, clubbing or edema.   Vascular: Pulses equal and strong throughout.   Neurological: AAOx3, no CN deficits, strength and sensation intact throughout.   Skin: No gross skin abnormalities or rashes  CIWA: 0       #Discharge: do not delete    55 yo M with PMH significant for 40+ pack year smoking history, HTN (untreated), SAVANNAH (no longer on CPAP after significant 100+ lb intentional wt loss), alcohol abuse (5+ mixed drinks daily) presenting from [facility] with altered mental status iso alcohol intoxication, admitted for hypercarbic respiratory failure likely 2/2 alcohol intoxication vs possible superimposed opioid overdose.    Hospital course (by problem):     #Acute Respiratory failure with hypoxia and hypercapnia  - Pt with AMS 2/2 hypercarbic respiratory failure iso alcohol intoxication vs possible superimposed opioid use. Found unresponsive in [place] station after admitted extensive alcohol use overnight.  - Acidotic, hypercarbic, and hypoxic on admission. Utox+ alcohol (@ 159 mg/dl). Negative opioid panel, however pin point pupils on exam with some response to Narcan 2 mg IV in ED. Received additional narcan doses throughout the day.   - EEG showed no signs seizure?  - MS now improved, stable for discharge. Advised to continue using BIPAP outpatient.     #Possible COPD   - 40+ pack year history. CT 10/14: Mild paraseptal emphysema. Mosaic attenuation. Mild bibasilar subpleural reticulation. No consolidation.   - Respiratory function Improved on duonebs and Bipap  -Prednisone 40 mg for 5 days (10/14-10/18)   - Follow up outpatient pulmonology for PFTs, and further management.   - started Symbicort and Albuterol PRN    #Alcohol intoxication  - iso heavy alcohol use. Advised pt on alcohol cessation  - continue thiamine supplementation & multivitamin    #HTN (hypertension).   - History of untreated HTN, hospital stay with multiple episodes hypertensive urgency with systolic 180-200s. S/p labetalol 10 mg x1, 5mg x2.  -Started amlodipine 10 mg qd (increased from 5mg).  -Started Losartan 25    Patient was discharged to: home    New medications: Amlodipine 10mg, Losartan 25  Changes to old medications: None  Medications that were stopped: None    Items to follow up as outpatient: Pulmonology outpt for PFTs    Physical exam at the time of discharge:  Constitutional: NAD,  HEENT: NC/AT; EOMI, no conjunctival pallor or scleral icterus, MMM  Neck: Supple, no JVD  Respiratory: diffuse wheezing & ronchi R>L, more prominent in upper lung fields.   Cardiovascular: RRR, +systolic murmur;  Gastrointestinal: +BS, soft NTND, mild hepatomegaly  Extremities: wwp; no cyanosis, clubbing or edema.   Vascular: Pulses equal and strong throughout.   Neurological: AAOx3, no CN deficits, strength and sensation intact throughout.   Skin: No gross skin abnormalities or rashes  CIWA: 0       #Discharge: do not delete    57 yo M with PMH significant for 40+ pack year smoking history, HTN (untreated), SAVANNAH (no longer on CPAP after significant 100+ lb intentional wt loss), alcohol abuse (5+ mixed drinks daily) presenting from [facility] with altered mental status iso alcohol intoxication, admitted for hypercarbic respiratory failure likely 2/2 alcohol intoxication vs possible superimposed opioid overdose.    Hospital course (by problem):     #Acute Respiratory failure with hypercapnia  - Pt with AMS 2/2 hypercarbic respiratory failure iso alcohol intoxication vs possible superimposed opioid use. Found unresponsive in [place] station after admitted extensive alcohol use overnight.  - Acidotic and hypercarbic hypoxic on admission. Utox+ alcohol (@ 159 mg/dl). Negative opioid panel, however pin point pupils on exam with some response to Narcan 2 mg IV in ED. Received additional narcan doses throughout the day.   - EEG showed no signs seizure?  - MS now improved, stable for discharge. Advised to continue using BIPAP outpatient.     #Possible COPD   - 40+ pack year history. CT 10/14: Mild paraseptal emphysema. Mosaic attenuation. Mild bibasilar subpleural reticulation. No consolidation.   - Respiratory function Improved on duonebs and Bipap  -Prednisone 40 mg for 5 days (10/14-10/18)   - Follow up outpatient pulmonology for PFTs, and further management.   - started Symbicort and Albuterol PRN    #Alcohol intoxication  - iso heavy alcohol use. Advised pt on alcohol cessation  - continue thiamine supplementation & multivitamin    #HTN (hypertension).   - History of untreated HTN, hospital stay with multiple episodes hypertensive urgency with systolic 180-200s. S/p labetalol 10 mg x1, 5mg x2.  -Started amlodipine 10 mg qd (increased from 5mg).  -Started Losartan 25    Patient was discharged to: home    New medications: Amlodipine 10mg, Losartan 25  Changes to old medications: None  Medications that were stopped: None    Items to follow up as outpatient: Pulmonology outpt for PFTs    Physical exam at the time of discharge:  Constitutional: NAD,  HEENT: NC/AT; EOMI, no conjunctival pallor or scleral icterus, MMM  Neck: Supple, no JVD  Respiratory: diffuse wheezing & ronchi R>L, more prominent in upper lung fields.   Cardiovascular: RRR, +systolic murmur;  Gastrointestinal: +BS, soft NTND, mild hepatomegaly  Extremities: wwp; no cyanosis, clubbing or edema.   Vascular: Pulses equal and strong throughout.   Neurological: AAOx3, no CN deficits, strength and sensation intact throughout.   Skin: No gross skin abnormalities or rashes  CIWA: 0

## 2022-10-15 NOTE — PROGRESS NOTE ADULT - PROBLEM SELECTOR PLAN 6
TSH 16 on admission. Admits to family hx of thyroid disorder however denies personal hx. Asymptomatic at this time. Likely elevated TSH iso critically ill patient.   - free T4 7.38 (normal)  - f/u endo outpatient

## 2022-10-15 NOTE — SWALLOW BEDSIDE ASSESSMENT ADULT - SLP GENERAL OBSERVATIONS
Pt received awake and alert, on RA, with VEEG in progress. Pt denies difficulty swallowing at this time. Makes all wants/needs known w/o difficulty.

## 2022-10-15 NOTE — SWALLOW BEDSIDE ASSESSMENT ADULT - COMMENTS
On 10/14 pt c/o difficulty swallowing soup (per family at bedside), although pt does not remember this. Today pt is awake/alert with improved mental status and denies difficulty swallowing. Started on Regular diet by team.

## 2022-10-15 NOTE — DISCHARGE NOTE PROVIDER - NSDCCPCAREPLAN_GEN_ALL_CORE_FT
PRINCIPAL DISCHARGE DIAGNOSIS  Diagnosis: Acute respiratory failure with hypoxia  Assessment and Plan of Treatment: You were treated with supplemental oxygen. It appears the alcohol intoxication may have been the biggest contributor to your acute illness with some exacerbation of COPD. You were treated with supportive care for the alcohol intoxication. We started you on inhalers. Take the symbicort 2puffs two times a day. 10-12 hours apart. You will also have an Albuterol rescue inhaler to use if you have symptoms such as wheezing. DO NOT TAKE THE ALBUTEROL EVERYDAY. This is to help with symptoms in the moment. If you continue to have symptoms while on the symbicort consult your PCP.  Follow up with your PCP within one week of discharge.      SECONDARY DISCHARGE DIAGNOSES  Diagnosis: Hypertension  Assessment and Plan of Treatment: We started you on losartan and amlodipine. Please follow up with your outpatient doctor within a week. Please obtain a BP cuff to measure your blood pressure at home after you take your medications while you are calm. Do this everyday before seeing your PCP in order to determine if the BP meds are effective.

## 2022-10-15 NOTE — PROGRESS NOTE ADULT - SUBJECTIVE AND OBJECTIVE BOX
AM: SBP 190s, repeat 210s --> gave labetalol 5mg --> improved to to SBP 170s.   SUBJECTIVE / INTERVAL HPI: Patient seen and examined at bedside. Anxious about events leading to hospitalization. Discussed importance of alcohol/smoking cessation, with family at bedside.     VITAL SIGNS:  Vital Signs Last 24 Hrs  T(C): 37 (15 Oct 2022 17:20), Max: 37.1 (15 Oct 2022 11:00)  T(F): 98.6 (15 Oct 2022 17:20), Max: 98.8 (15 Oct 2022 14:00)  HR: 60 (15 Oct 2022 16:15) (60 - 86)  BP: 158/77 (15 Oct 2022 16:15) (144/69 - 210/89)  BP(mean): 109 (15 Oct 2022 16:15) (99 - 131)  RR: 18 (15 Oct 2022 16:15) (13 - 23)  SpO2: 92% (15 Oct 2022 16:15) (92% - 99%)    Parameters below as of 15 Oct 2022 16:15  Patient On (Oxygen Delivery Method): room air        PHYSICAL EXAM:    Constitutional: NAD,  HEENT: NC/AT; EOMI, no conjunctival pallor or scleral icterus, MMM  Neck: Supple, no JVD  Respiratory: diffuse wheezing & ronchi R>L, more prominent in upper lung fields.   Cardiovascular: RRR, +systolic murmur;  Gastrointestinal: +BS, soft NTND, mild hepatomegaly  Extremities: wwp; no cyanosis, clubbing or edema.   Vascular: Pulses equal and strong throughout.   Neurological: AAOx3, no CN deficits, strength and sensation intact throughout.   Skin: No gross skin abnormalities or rashes  CIWA: 0    MEDICATIONS:  MEDICATIONS  (STANDING):  albuterol/ipratropium for Nebulization 3 milliLiter(s) Nebulizer every 4 hours  amLODIPine   Tablet 10 milliGRAM(s) Oral every 24 hours  chlorhexidine 2% Cloths 1 Application(s) Topical <User Schedule>  dextrose 5%. 1000 milliLiter(s) (100 mL/Hr) IV Continuous <Continuous>  dextrose 5%. 1000 milliLiter(s) (50 mL/Hr) IV Continuous <Continuous>  dextrose 50% Injectable 25 Gram(s) IV Push once  dextrose 50% Injectable 12.5 Gram(s) IV Push once  dextrose 50% Injectable 25 Gram(s) IV Push once  glucagon  Injectable 1 milliGRAM(s) IntraMuscular once  heparin   Injectable 5000 Unit(s) SubCutaneous every 8 hours  influenza   Vaccine 0.5 milliLiter(s) IntraMuscular once  insulin lispro (ADMELOG) corrective regimen sliding scale   SubCutaneous Before meals and at bedtime  melatonin 5 milliGRAM(s) Oral at bedtime  multivitamin 1 Tablet(s) Oral daily  nicotine -  14 mG/24Hr(s) Patch 1 Patch Transdermal daily  predniSONE   Tablet 40 milliGRAM(s) Oral daily  thiamine IVPB 500 milliGRAM(s) IV Intermittent every 8 hours    MEDICATIONS  (PRN):  dextrose Oral Gel 15 Gram(s) Oral once PRN Blood Glucose LESS THAN 70 milliGRAM(s)/deciliter  diphenhydrAMINE 25 milliGRAM(s) Oral every 6 hours PRN Rash and/or Itching      ALLERGIES:  Allergies    No Known Allergies    Intolerances        LABS:                        14.5   9.67  )-----------( 144      ( 15 Oct 2022 05:30 )             44.4     10-15    140  |  105  |  24<H>  ----------------------------<  111<H>  4.7   |  28  |  1.03    Ca    8.6      15 Oct 2022 05:30  Phos  3.0     10-15  Mg     2.1     10-15    TPro  7.3  /  Alb  4.5  /  TBili  0.6  /  DBili  x   /  AST  28  /  ALT  24  /  AlkPhos  122<H>  10-14    PT/INR - ( 14 Oct 2022 07:38 )   PT: 11.7 sec;   INR: 0.98          PTT - ( 14 Oct 2022 07:38 )  PTT:30.1 sec  Urinalysis Basic - ( 13 Oct 2022 22:56 )    Color: Yellow / Appearance: Clear / S.025 / pH: x  Gluc: x / Ketone: NEGATIVE  / Bili: NEGATIVE / Urobili: 0.2 E.U./dL   Blood: x / Protein: 100 mg/dL / Nitrite: NEGATIVE   Leuk Esterase: Small / RBC: < 5 /HPF / WBC > 10 /HPF   Sq Epi: x / Non Sq Epi: 0-5 /HPF / Bacteria: Present /HPF      CAPILLARY BLOOD GLUCOSE      POCT Blood Glucose.: 110 mg/dL (15 Oct 2022 17:17)      RADIOLOGY & ADDITIONAL TESTS: Reviewed.

## 2022-10-15 NOTE — PROGRESS NOTE ADULT - PROBLEM SELECTOR PLAN 4
History of untreated HTN with hypertensive urgency with systolic 180-200s. S/p labetalol 10 mg x1, 5mg x1.  Asymptomatic  -Started amlodipine 5 mg qd History of untreated HTN with hypertensive urgency with systolic 180-200s. S/p labetalol 10 mg x1, 5mg x2.  Asymptomatic  -Started amlodipine 20 mg qd (increased from 5mg)

## 2022-10-15 NOTE — PROGRESS NOTE ADULT - PROBLEM SELECTOR PLAN 5
EKG NSR. Trops elevated to 0.05 on admission, likely demand ischemia. No hx per pt or wife at bedside of CAD or cardiac pathology. Denies having previous ECHOs or stress tests. Denies chest pain, palpitations.  -Trend troponins to peak  -Obtain baseline ECHO RESOLVEd  EKG NSR. Trops elevated to 0.05 on admission, likely demand ischemia. No hx per pt or wife at bedside of CAD or cardiac pathology. Denies having previous ECHOs or stress tests. Denies chest pain, palpitations.  - Troponins peaked at 0.06 --> 0.04, will no longer trend

## 2022-10-15 NOTE — PROGRESS NOTE ADULT - PROBLEM SELECTOR PLAN 3
Assessment as above  -WA protocol   -Avoiding standing benzos iso AMS  -C/w high dose thiamine for 3 days (10/14-10/16)  -C/w MV
Assessment as above  -WA protocol   -Avoiding standing benzos iso AMS  -C/w high dose thiamine for 3 days (10/14-10/16)  -C/w MV

## 2022-10-15 NOTE — DISCHARGE NOTE PROVIDER - NSDCMRMEDTOKEN_GEN_ALL_CORE_FT
Multiple Vitamins oral tablet: 1 tab(s) orally once a day  predniSONE 20 mg oral tablet: 2 tab(s) orally once a day   amLODIPine 10 mg oral tablet: 1 tab(s) orally every 24 hours  Blood Pressure CUff: 1 measurement in the morning and 1 measurement in the evening  budesonide-formoterol 80 mcg-4.5 mcg/inh inhalation aerosol: 2 milligram(s) inhaled 2 times a day   losartan 25 mg oral tablet: 1 tab(s) orally once a day  Multiple Vitamins oral tablet: 1 tab(s) orally once a day  nicotine 14 mg/24 hr transdermal film, extended release: 1 patch transdermal once a day   Ventolin HFA 90 mcg/inh inhalation aerosol: 2 puff(s) inhaled every 6 hours

## 2022-10-15 NOTE — PROGRESS NOTE ADULT - PROBLEM SELECTOR PLAN 1
2/2 hypercarbic respiratory failure iso alcohol intoxication vs possible superimposed opioid use. Found unresponsive in [place] station after admitted extensive alcohol use overnight (cannot quantify amount). Hx of 5-6 mixed drinks daily per wife at bedside, Utox+ alcohol (@ 159 mg/dl). Negative opioid panel, however pin point pupils on exam with some response to Narcan 2 mg IV in ED. Currently remains intermittently lethargic. ABG acidotic @ 7.26 with hypercarbia (CO2: 57); Likely CO2 retainer with undiagnosed COPD iso smoking history and rhonchi on lung exam   -Management for etoh intoxication as below   -Given additional narcan 0.4 and 0.8 during day (10/14) iso persisting hypercarbia, pinpoint pupils iso AMS   -Serial ABGs to monitor hypercarbia  -pH 7.22->7.34, CO2 58 -> 47 while on BiPAP; remained on BiPAP overnight 2/2 hypercarbic respiratory failure iso alcohol intoxication vs possible superimposed opioid use. Found unresponsive in [place] station after admitted extensive alcohol use overnight (cannot quantify amount). Hx of 5-6 mixed drinks daily per wife at bedside, Utox+ alcohol (@ 159 mg/dl). Negative opioid panel, however pin point pupils on exam with some response to Narcan 2 mg IV in ED. Currently remains intermittently lethargic. ABG acidotic @ 7.26 with hypercarbia (CO2: 57); Likely CO2 retainer with undiagnosed COPD iso smoking history and rhonchi on lung exam   -Management for etoh intoxication as below   -Given additional narcan 0.4 and 0.8 during day (10/14)    - continue BIPAP overnight  - 12hr EEG to rule out seizures as cause for AMS

## 2022-10-16 VITALS — HEART RATE: 74 BPM | RESPIRATION RATE: 17 BRPM | DIASTOLIC BLOOD PRESSURE: 73 MMHG | SYSTOLIC BLOOD PRESSURE: 147 MMHG

## 2022-10-16 LAB
BASOPHILS # BLD AUTO: 0.03 K/UL — SIGNIFICANT CHANGE UP (ref 0–0.2)
BASOPHILS NFR BLD AUTO: 0.4 % — SIGNIFICANT CHANGE UP (ref 0–2)
EOSINOPHIL # BLD AUTO: 0.09 K/UL — SIGNIFICANT CHANGE UP (ref 0–0.5)
EOSINOPHIL NFR BLD AUTO: 1.2 % — SIGNIFICANT CHANGE UP (ref 0–6)
GLUCOSE BLDC GLUCOMTR-MCNC: 101 MG/DL — HIGH (ref 70–99)
GLUCOSE BLDC GLUCOMTR-MCNC: 150 MG/DL — HIGH (ref 70–99)
GLUCOSE BLDC GLUCOMTR-MCNC: 156 MG/DL — HIGH (ref 70–99)
HCT VFR BLD CALC: 44.3 % — SIGNIFICANT CHANGE UP (ref 39–50)
HGB BLD-MCNC: 14.5 G/DL — SIGNIFICANT CHANGE UP (ref 13–17)
IMM GRANULOCYTES NFR BLD AUTO: 0.4 % — SIGNIFICANT CHANGE UP (ref 0–0.9)
LYMPHOCYTES # BLD AUTO: 1.8 K/UL — SIGNIFICANT CHANGE UP (ref 1–3.3)
LYMPHOCYTES # BLD AUTO: 24.7 % — SIGNIFICANT CHANGE UP (ref 13–44)
MAGNESIUM SERPL-MCNC: 2 MG/DL — SIGNIFICANT CHANGE UP (ref 1.6–2.6)
MCHC RBC-ENTMCNC: 32.2 PG — SIGNIFICANT CHANGE UP (ref 27–34)
MCHC RBC-ENTMCNC: 32.7 GM/DL — SIGNIFICANT CHANGE UP (ref 32–36)
MCV RBC AUTO: 98.4 FL — SIGNIFICANT CHANGE UP (ref 80–100)
MONOCYTES # BLD AUTO: 0.81 K/UL — SIGNIFICANT CHANGE UP (ref 0–0.9)
MONOCYTES NFR BLD AUTO: 11.1 % — SIGNIFICANT CHANGE UP (ref 2–14)
NEUTROPHILS # BLD AUTO: 4.54 K/UL — SIGNIFICANT CHANGE UP (ref 1.8–7.4)
NEUTROPHILS NFR BLD AUTO: 62.2 % — SIGNIFICANT CHANGE UP (ref 43–77)
NRBC # BLD: 0 /100 WBCS — SIGNIFICANT CHANGE UP (ref 0–0)
PHOSPHATE SERPL-MCNC: 2.4 MG/DL — LOW (ref 2.5–4.5)
PLATELET # BLD AUTO: 138 K/UL — LOW (ref 150–400)
RBC # BLD: 4.5 M/UL — SIGNIFICANT CHANGE UP (ref 4.2–5.8)
RBC # FLD: 12.7 % — SIGNIFICANT CHANGE UP (ref 10.3–14.5)
WBC # BLD: 7.3 K/UL — SIGNIFICANT CHANGE UP (ref 3.8–10.5)
WBC # FLD AUTO: 7.3 K/UL — SIGNIFICANT CHANGE UP (ref 3.8–10.5)

## 2022-10-16 PROCEDURE — 92610 EVALUATE SWALLOWING FUNCTION: CPT

## 2022-10-16 PROCEDURE — 82330 ASSAY OF CALCIUM: CPT

## 2022-10-16 PROCEDURE — 81001 URINALYSIS AUTO W/SCOPE: CPT

## 2022-10-16 PROCEDURE — 99291 CRITICAL CARE FIRST HOUR: CPT

## 2022-10-16 PROCEDURE — 83605 ASSAY OF LACTIC ACID: CPT

## 2022-10-16 PROCEDURE — 94640 AIRWAY INHALATION TREATMENT: CPT

## 2022-10-16 PROCEDURE — 80053 COMPREHEN METABOLIC PANEL: CPT

## 2022-10-16 PROCEDURE — 84100 ASSAY OF PHOSPHORUS: CPT

## 2022-10-16 PROCEDURE — 93306 TTE W/DOPPLER COMPLETE: CPT

## 2022-10-16 PROCEDURE — 70450 CT HEAD/BRAIN W/O DYE: CPT

## 2022-10-16 PROCEDURE — 80307 DRUG TEST PRSMV CHEM ANLYZR: CPT

## 2022-10-16 PROCEDURE — 86901 BLOOD TYPING SEROLOGIC RH(D): CPT

## 2022-10-16 PROCEDURE — 85610 PROTHROMBIN TIME: CPT

## 2022-10-16 PROCEDURE — 84443 ASSAY THYROID STIM HORMONE: CPT

## 2022-10-16 PROCEDURE — 95716 VEEG EA 12-26HR CONT MNTR: CPT

## 2022-10-16 PROCEDURE — 95720 EEG PHY/QHP EA INCR W/VEEG: CPT

## 2022-10-16 PROCEDURE — 86803 HEPATITIS C AB TEST: CPT

## 2022-10-16 PROCEDURE — 96374 THER/PROPH/DIAG INJ IV PUSH: CPT

## 2022-10-16 PROCEDURE — 72125 CT NECK SPINE W/O DYE: CPT

## 2022-10-16 PROCEDURE — 85730 THROMBOPLASTIN TIME PARTIAL: CPT

## 2022-10-16 PROCEDURE — 87086 URINE CULTURE/COLONY COUNT: CPT

## 2022-10-16 PROCEDURE — 96361 HYDRATE IV INFUSION ADD-ON: CPT

## 2022-10-16 PROCEDURE — 85025 COMPLETE CBC W/AUTO DIFF WBC: CPT

## 2022-10-16 PROCEDURE — 36415 COLL VENOUS BLD VENIPUNCTURE: CPT

## 2022-10-16 PROCEDURE — 82550 ASSAY OF CK (CPK): CPT

## 2022-10-16 PROCEDURE — 99238 HOSP IP/OBS DSCHRG MGMT 30/<: CPT | Mod: GC

## 2022-10-16 PROCEDURE — 74018 RADEX ABDOMEN 1 VIEW: CPT

## 2022-10-16 PROCEDURE — 82375 ASSAY CARBOXYHB QUANT: CPT

## 2022-10-16 PROCEDURE — 86850 RBC ANTIBODY SCREEN: CPT

## 2022-10-16 PROCEDURE — 84295 ASSAY OF SERUM SODIUM: CPT

## 2022-10-16 PROCEDURE — 87635 SARS-COV-2 COVID-19 AMP PRB: CPT

## 2022-10-16 PROCEDURE — 82803 BLOOD GASES ANY COMBINATION: CPT

## 2022-10-16 PROCEDURE — 95700 EEG CONT REC W/VID EEG TECH: CPT

## 2022-10-16 PROCEDURE — 84132 ASSAY OF SERUM POTASSIUM: CPT

## 2022-10-16 PROCEDURE — 84484 ASSAY OF TROPONIN QUANT: CPT

## 2022-10-16 PROCEDURE — 80048 BASIC METABOLIC PNL TOTAL CA: CPT

## 2022-10-16 PROCEDURE — 83050 HGB METHEMOGLOBIN QUAN: CPT

## 2022-10-16 PROCEDURE — 94660 CPAP INITIATION&MGMT: CPT

## 2022-10-16 PROCEDURE — 83735 ASSAY OF MAGNESIUM: CPT

## 2022-10-16 PROCEDURE — 84436 ASSAY OF TOTAL THYROXINE: CPT

## 2022-10-16 PROCEDURE — 82553 CREATINE MB FRACTION: CPT

## 2022-10-16 PROCEDURE — 82962 GLUCOSE BLOOD TEST: CPT

## 2022-10-16 PROCEDURE — 71045 X-RAY EXAM CHEST 1 VIEW: CPT

## 2022-10-16 PROCEDURE — 71275 CT ANGIOGRAPHY CHEST: CPT

## 2022-10-16 PROCEDURE — 83036 HEMOGLOBIN GLYCOSYLATED A1C: CPT

## 2022-10-16 PROCEDURE — 86900 BLOOD TYPING SEROLOGIC ABO: CPT

## 2022-10-16 RX ORDER — NICOTINE POLACRILEX 2 MG
1 GUM BUCCAL
Qty: 30 | Refills: 0
Start: 2022-10-16 | End: 2022-11-14

## 2022-10-16 RX ORDER — LOSARTAN POTASSIUM 100 MG/1
25 TABLET, FILM COATED ORAL DAILY
Refills: 0 | Status: DISCONTINUED | OUTPATIENT
Start: 2022-10-16 | End: 2022-10-16

## 2022-10-16 RX ORDER — AMLODIPINE BESYLATE 2.5 MG/1
1 TABLET ORAL
Qty: 30 | Refills: 0
Start: 2022-10-16 | End: 2022-11-14

## 2022-10-16 RX ORDER — BUDESONIDE AND FORMOTEROL FUMARATE DIHYDRATE 160; 4.5 UG/1; UG/1
2 AEROSOL RESPIRATORY (INHALATION)
Qty: 1 | Refills: 0
Start: 2022-10-16 | End: 2022-11-14

## 2022-10-16 RX ORDER — ALBUTEROL 90 UG/1
2 AEROSOL, METERED ORAL
Qty: 1 | Refills: 0
Start: 2022-10-16 | End: 2022-11-14

## 2022-10-16 RX ORDER — BUDESONIDE AND FORMOTEROL FUMARATE DIHYDRATE 160; 4.5 UG/1; UG/1
2 AEROSOL RESPIRATORY (INHALATION)
Refills: 0 | Status: DISCONTINUED | OUTPATIENT
Start: 2022-10-16 | End: 2022-10-16

## 2022-10-16 RX ORDER — LOSARTAN POTASSIUM 100 MG/1
1 TABLET, FILM COATED ORAL
Qty: 30 | Refills: 0
Start: 2022-10-16 | End: 2022-11-14

## 2022-10-16 RX ORDER — POTASSIUM PHOSPHATE, MONOBASIC POTASSIUM PHOSPHATE, DIBASIC 236; 224 MG/ML; MG/ML
30 INJECTION, SOLUTION INTRAVENOUS ONCE
Refills: 0 | Status: COMPLETED | OUTPATIENT
Start: 2022-10-16 | End: 2022-10-16

## 2022-10-16 RX ADMIN — CHLORHEXIDINE GLUCONATE 1 APPLICATION(S): 213 SOLUTION TOPICAL at 06:07

## 2022-10-16 RX ADMIN — Medication 3 MILLILITER(S): at 14:52

## 2022-10-16 RX ADMIN — Medication 105 MILLIGRAM(S): at 06:08

## 2022-10-16 RX ADMIN — BUDESONIDE AND FORMOTEROL FUMARATE DIHYDRATE 2 PUFF(S): 160; 4.5 AEROSOL RESPIRATORY (INHALATION) at 14:53

## 2022-10-16 RX ADMIN — Medication 3 MILLILITER(S): at 06:08

## 2022-10-16 RX ADMIN — POTASSIUM PHOSPHATE, MONOBASIC POTASSIUM PHOSPHATE, DIBASIC 83.33 MILLIMOLE(S): 236; 224 INJECTION, SOLUTION INTRAVENOUS at 11:34

## 2022-10-16 RX ADMIN — Medication 105 MILLIGRAM(S): at 15:20

## 2022-10-16 RX ADMIN — Medication 3 MILLILITER(S): at 10:56

## 2022-10-16 RX ADMIN — HEPARIN SODIUM 5000 UNIT(S): 5000 INJECTION INTRAVENOUS; SUBCUTANEOUS at 14:53

## 2022-10-16 RX ADMIN — Medication 1 TABLET(S): at 13:31

## 2022-10-16 RX ADMIN — LOSARTAN POTASSIUM 25 MILLIGRAM(S): 100 TABLET, FILM COATED ORAL at 12:32

## 2022-10-16 RX ADMIN — HEPARIN SODIUM 5000 UNIT(S): 5000 INJECTION INTRAVENOUS; SUBCUTANEOUS at 06:07

## 2022-10-16 RX ADMIN — Medication 1 PATCH: at 06:13

## 2022-10-16 RX ADMIN — Medication 40 MILLIGRAM(S): at 06:08

## 2022-10-16 NOTE — EEG REPORT - NS EEG TEXT BOX
[facility] Department of Neurology  Inpatient Continuous video-Electroencephalogram    Patient Name:	ALISE AHUMADA    :	1965  MRN:	[id number]    Study Start Date/Time:  10/15/2022, 2:23:52 PM  Study End Date/Time:  10/16/2022, 2:25 PM    Referred by: Rene Escalona MD    Brief Clinical History:  ALISE AHUMADA is a 56 year old Male; study performed to investigate for seizures or markers of epilepsy.    Diagnosis Code:   R40.4 Transient alteration of awareness  The live video was: monitored intermittently by trained technicians.    Pertinent Medications:  n/a    Acquisition Details:  Electroencephalography was acquired using a minimum of 21 channels on an [employer] Neurology system v 8.5.1 with electrode placement according to the standard International 10-20 system following ACNS (American Clinical Neurophysiology Society) guidelines for Long-Term Video EEG monitoring.  Anterior temporal T1 and T2 electrodes were utilized whenever possible.   The [employer] automated spike & seizure detections were all reviewed in detail, in addition to extensive portions of raw EEG.      Day 1: 10/15/2022 @ 2:23:52 PM to next morning @ 07:00 am  Background:  continuous, with predominantly alpha and beta frequencies.  Symmetry:  No persistent asymmetries of voltage or frequency.  Posterior Dominant Rhythm:  8.5 Hz symmetric, well-organized, and well-modulated.  Organization: Appropriate anterior-posterior gradient.  Voltage:  Normal (20+ uV)  Variability: Yes. 		Reactivity: Yes.  N2 sleep: Symmetric, synchronous spindles and K complexes.  Spontaneous Activity:  No epileptiform discharges.  Periodic/rhythmic activity:  None.  Events:  No electrographic seizures or significant clinical events.  Provocations:  Hyperventilation and Photic stimulation: was not performed.    Daily Summary:    Normal EEG, awake and asleep.  No epileptiform activity and no significant clinical events occurred.      Hiral Priest MD  Attending Neurologist, [facility]      Daily Updates (from 07:00 am until 07:00 am):    Day 2  10/16/2022 @ 7 AM to 10/16/2022 @ 2:25 PM   Pertinent medications:   Background:  continuous, with predominantly alpha and beta frequencies.  Symmetry:  No persistent asymmetries of voltage or frequency.  Posterior Dominant Rhythm:  8.5 Hz symmetric, well-organized, and well-modulated.  Organization: Appropriate anterior-posterior gradient.  Voltage:  Normal (20+ uV)  Variability: Yes. 		Reactivity: Yes.  N2 sleep: Symmetric, synchronous spindles and K complexes.  Spontaneous Activity:  No epileptiform discharges.  Periodic/rhythmic activity:  None.  Events:  No electrographic seizures or significant clinical events.  Provocations:  Hyperventilation and Photic stimulation: was not performed.    Daily Summary:    Normal EEG, awake and asleep.  No epileptiform activity and no significant clinical events occurred.  This is unchanged from the prior recording.      Hiral Priest MD  Attending Neurologist, [facility]        FINAL Summary:  Normal continuous av-EEG study.  1)	The EEG remained normal throughout the study.      Final Clinical Correlation:  There were no findings of active epilepsy, however this alone does not rule out the diagnosis.           Hiral Priest MD  Attending Neurologist, [facility]

## 2022-10-16 NOTE — EEG REPORT - NS EEG TEXT BOX
[facility] Department of Neurology  Inpatient Continuous video-Electroencephalogram    Patient Name:	ALISE AHUMADA    :	1965  MRN:	[id number]    Study Start Date/Time:  10/15/2022, 2:23:52 PM  Study End Date/Time:  in progress    Referred by: Rene Escalona MD    Brief Clinical History:  ALISE AHUMADA is a 56 year old Male; study performed to investigate for seizures or markers of epilepsy.    Diagnosis Code:   R40.4 Transient alteration of awareness  The live video was: monitored intermittently by trained technicians.    Pertinent Medications:  n/a    Acquisition Details:  Electroencephalography was acquired using a minimum of 21 channels on an [employer] Neurology system v 8.5.1 with electrode placement according to the standard International 10-20 system following ACNS (American Clinical Neurophysiology Society) guidelines for Long-Term Video EEG monitoring.  Anterior temporal T1 and T2 electrodes were utilized whenever possible.   The [employer]K automated spike & seizure detections were all reviewed in detail, in addition to extensive portions of raw EEG.      Day 1: 10/15/2022 @ 2:23:52 PM to next morning @ 07:00 am  Background:  continuous, with predominantly alpha and beta frequencies.  Symmetry:  No persistent asymmetries of voltage or frequency.  Posterior Dominant Rhythm:  8.5 Hz symmetric, well-organized, and well-modulated.  Organization: Appropriate anterior-posterior gradient.  Voltage:  Normal (20+ uV)  Variability: Yes. 		Reactivity: Yes.  N2 sleep: Symmetric, synchronous spindles and K complexes.  Spontaneous Activity:  No epileptiform discharges.  Periodic/rhythmic activity:  None.  Events:  No electrographic seizures or significant clinical events.  Provocations:  Hyperventilation and Photic stimulation: was not performed.    Daily Summary:    Normal EEG, awake and asleep.  No epileptiform activity and no significant clinical events occurred.          Hiral Priest MD  Attending Neurologist, [facility]

## 2022-10-16 NOTE — DISCHARGE NOTE NURSING/CASE MANAGEMENT/SOCIAL WORK - PATIENT PORTAL LINK FT
You can access the FollowMyHealth Patient Portal offered by [facility] by registering at the following website: [email or web address]. By joining [employer]’s FollowMyHealth portal, you will also be able to view your health information using other applications (apps) compatible with our system.

## 2022-10-16 NOTE — DISCHARGE NOTE NURSING/CASE MANAGEMENT/SOCIAL WORK - NSDCPEFALRISK_GEN_ALL_CORE
For information on Fall & Injury Prevention, visit: [email or web address] OR  [email or web address] OR  https://www.cdc.gov/steadi/patient.html
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
## 2022-10-25 DIAGNOSIS — F17.210 NICOTINE DEPENDENCE, CIGARETTES, UNCOMPLICATED: ICD-10-CM

## 2022-10-25 DIAGNOSIS — Z83.49 FAMILY HISTORY OF OTHER ENDOCRINE, NUTRITIONAL AND METABOLIC DISEASES: ICD-10-CM

## 2022-10-25 DIAGNOSIS — G92.9 UNSPECIFIED TOXIC ENCEPHALOPATHY: ICD-10-CM

## 2022-10-25 DIAGNOSIS — Z20.822 CONTACT WITH AND (SUSPECTED) EXPOSURE TO COVID-19: ICD-10-CM

## 2022-10-25 DIAGNOSIS — I16.0 HYPERTENSIVE URGENCY: ICD-10-CM

## 2022-10-25 DIAGNOSIS — J44.1 CHRONIC OBSTRUCTIVE PULMONARY DISEASE WITH (ACUTE) EXACERBATION: ICD-10-CM

## 2022-10-25 DIAGNOSIS — R94.6 ABNORMAL RESULTS OF THYROID FUNCTION STUDIES: ICD-10-CM

## 2022-10-25 DIAGNOSIS — G47.33 OBSTRUCTIVE SLEEP APNEA (ADULT) (PEDIATRIC): ICD-10-CM

## 2022-10-25 DIAGNOSIS — T51.91XA TOXIC EFFECT OF UNSPECIFIED ALCOHOL, ACCIDENTAL (UNINTENTIONAL), INITIAL ENCOUNTER: ICD-10-CM

## 2022-10-25 DIAGNOSIS — J96.01 ACUTE RESPIRATORY FAILURE WITH HYPOXIA: ICD-10-CM

## 2022-10-25 DIAGNOSIS — I24.8 OTHER FORMS OF ACUTE ISCHEMIC HEART DISEASE: ICD-10-CM
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
## 2022-10-25 DIAGNOSIS — J96.02 ACUTE RESPIRATORY FAILURE WITH HYPERCAPNIA: ICD-10-CM

## 2022-10-25 DIAGNOSIS — I10 ESSENTIAL (PRIMARY) HYPERTENSION: ICD-10-CM

## 2022-10-25 DIAGNOSIS — F10.129 ALCOHOL ABUSE WITH INTOXICATION, UNSPECIFIED: ICD-10-CM

## 2022-11-23 NOTE — DISCHARGE NOTE PROVIDER - REASON FOR ADMISSION
Hypercapnic respiratory failure, etoh Pt received to intake room 7. Pt A&Ox4, ambulatory at baseline. Pt with c/o headache and flank pain. Denies any CP, SOB, dizziness. Pt febrile with 101.4F oral temp. IV access established with 20G to L AC, labs collected and sent as per orders. Safety measures in place, UCG running

## 2023-02-22 NOTE — H&P ADULT - NSHPPHYSICALEXAM_GEN_ALL_CORE
PHYSICAL EXAM:  Constitutional: NAD, intermittently lethargic unless persistently aroused   HEENT: NC/AT, pinpoint pupils with sluggish reaction; EOMI, no conjunctival pallor or scleral icterus, MMM  Neck: Supple, no JVD  Respiratory: Expiratory wheezing and rhonchi of upper lung fields, Decreased breath sounds at b/l lower bases   Cardiovascular: RRR, Grade 2 systolic murmur   Gastrointestinal: +BS, soft NTND, no guarding or rebound tenderness, no palpable masses   Extremities: wwp; no cyanosis, clubbing or edema.   Vascular: Pulses equal and strong throughout.   Neurological: AAOx1-2, confused, no CN deficits, strength and sensation intact throughout.   Skin: No gross skin abnormalities or rashes Split-Thickness Skin Graft Text: The defect edges were debeveled with a #15 scalpel blade.  Given the location of the defect, shape of the defect and the proximity to free margins a split thickness skin graft was deemed most appropriate.  Using a sterile surgical marker, the primary defect shape was transferred to the donor site. The split thickness graft was then harvested.  The skin graft was then placed in the primary defect and oriented appropriately.
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
## 2025-06-05 NOTE — DIETITIAN INITIAL EVALUATION ADULT - FUNCTIONAL SCREEN CURRENT LEVEL: SWALLOWING (IF SCORE 2 OR MORE FOR ANY ITEM, CONSULT REHAB SERVICES), MLM)
Instructions: This plan will send the code FBSE to the PM system.  DO NOT or CHANGE the price. Detail Level: Simple Price (Do Not Change): 0.00 NPO